# Patient Record
Sex: MALE | Race: WHITE | NOT HISPANIC OR LATINO | Employment: FULL TIME | URBAN - METROPOLITAN AREA
[De-identification: names, ages, dates, MRNs, and addresses within clinical notes are randomized per-mention and may not be internally consistent; named-entity substitution may affect disease eponyms.]

---

## 2018-02-25 ENCOUNTER — TELEPHONE (OUTPATIENT)
Dept: SPORTS MEDICINE | Facility: CLINIC | Age: 58
End: 2018-02-25

## 2018-02-25 DIAGNOSIS — M23.92 INTERNAL DERANGEMENT OF LEFT KNEE: Primary | ICD-10-CM

## 2018-02-26 ENCOUNTER — HOSPITAL ENCOUNTER (OUTPATIENT)
Dept: RADIOLOGY | Facility: HOSPITAL | Age: 58
Discharge: HOME | End: 2018-02-26
Attending: ORTHOPAEDIC SURGERY
Payer: COMMERCIAL

## 2018-02-26 DIAGNOSIS — M23.92 INTERNAL DERANGEMENT OF LEFT KNEE: ICD-10-CM

## 2018-02-26 PROCEDURE — 73721 MRI JNT OF LWR EXTRE W/O DYE: CPT | Mod: TC,PO,LT

## 2018-02-26 PROCEDURE — 73721 MRI JNT OF LWR EXTRE W/O DYE: CPT | Mod: 26,LT,, | Performed by: RADIOLOGY

## 2018-02-28 ENCOUNTER — ANESTHESIA EVENT (OUTPATIENT)
Dept: SURGERY | Facility: OTHER | Age: 58
End: 2018-02-28
Payer: COMMERCIAL

## 2018-02-28 ENCOUNTER — CLINICAL SUPPORT (OUTPATIENT)
Dept: REHABILITATION | Facility: HOSPITAL | Age: 58
End: 2018-02-28
Attending: ORTHOPAEDIC SURGERY
Payer: COMMERCIAL

## 2018-02-28 ENCOUNTER — TELEPHONE (OUTPATIENT)
Dept: SPORTS MEDICINE | Facility: CLINIC | Age: 58
End: 2018-02-28

## 2018-02-28 DIAGNOSIS — M25.562 CHRONIC PAIN OF LEFT KNEE: Primary | ICD-10-CM

## 2018-02-28 DIAGNOSIS — M23.42 LOOSE BODY OF LEFT KNEE: ICD-10-CM

## 2018-02-28 DIAGNOSIS — S83.242A TEAR OF MEDIAL MENISCUS OF LEFT KNEE, CURRENT, UNSPECIFIED TEAR TYPE, INITIAL ENCOUNTER: ICD-10-CM

## 2018-02-28 DIAGNOSIS — M25.562 LEFT ANTERIOR KNEE PAIN: Primary | ICD-10-CM

## 2018-02-28 DIAGNOSIS — G89.29 CHRONIC PAIN OF LEFT KNEE: Primary | ICD-10-CM

## 2018-02-28 DIAGNOSIS — M23.92 INTERNAL DERANGEMENT OF LEFT KNEE: Primary | ICD-10-CM

## 2018-02-28 PROCEDURE — 97161 PT EVAL LOW COMPLEX 20 MIN: CPT | Mod: PN | Performed by: PHYSICAL THERAPIST

## 2018-02-28 PROCEDURE — 97110 THERAPEUTIC EXERCISES: CPT | Mod: PN | Performed by: PHYSICAL THERAPIST

## 2018-02-28 NOTE — PROGRESS NOTES
[facility]   PATIENT EVALUATION    Date: 02/28/2018  Start Time: 11:30  Stop Time: 12:30    Patient Name: Virgilio Pitts Jr [facility] Number: [id number]  Age: 57 y.o.  Gender: male    Diagnosis:   Encounter Diagnosis   Name Primary?    Left anterior knee pain Yes       Referring Physician: Alfredito Babcock MD  Treatment Orders: PT Eval and Treat      History     No past medical history on file.    Current Outpatient Prescriptions   Medication Sig    aspirin (ECOTRIN) 81 MG EC tablet Take 81 mg by mouth once daily.       No current facility-administered medications for this visit.        Review of patient's allergies indicates:  No Known Allergies      Subjective     History of Present Condition: Pt reports going skiing over [place] and the left knee stiffened up. It buckled on me and just locked up that AM. It has been painful stiff since. It feels like it moves and I feel like I cant fully straighten it. Reports being able to sleep well but activities are limited.    PMHx:  Not remarkable    Onset Date: 5 years on and off  Date of Surgery: pending  Precautions: none    Mechanism of Injury: gradual    Pain Location: Left knee  Pain Description: Aching and Dull  Current Pain: 0/10  Least Pain: 0/10  Worst Pain: 9/10  Aggravating Factors:   Relieving Factors: rest, ice    Diagnostic Tests: MRI    Occupation: accounting/finances      Sports/Recreational Activities: golf, tennis, skiing  Extremity Dominance: R    Prior Level of Function: Independent  Functional Deficits Leading to Referral/Nature of Injury: none  Patient Therapy Goals: To get back to golf and tennis without pain or dysfunction  Cultural/Environmental/Spiritual Barriers to Treatment or Learning: none      Objective     Observation: decreased quad tone on RLE.  Posture: grossly normal  Gait: increased stance on LLE.      Palpation: TTP over medial joint line    Range of Motion:   Left knee: 0-5-116    Joint  Mobility: hypomobile patella grossly tested  Flexibility: increased tension with left hamstring/gastroc    Strength:   Diminished quad firing on LLE  Bilateral hip abd = 3+/5    Special Tests: + medial joint line tenderness      Treatment:   Seated extension x 5 min  Quad set towel x 30  Quad set flat x 30  CP extension x 10 min      Functional Limitations Reports - G Codes  Category: Mobility  Tool: FOTO  Score: 57    History  Co-morbidities and personal factors that may impact the plan of care Low    Stable Clinical Presentation   Co-morbidities:       Personal Factors:     Body regions    Body systems    Activity Limitations    Participation Restrictons   No personal factors and/ or  comorbidites          Address 1-2 elements:           Assessment     This is a 57 y.o. male referred to outpatient physical therapy and presents with a medical diagnosis of left knee pain and demonstrates limitations as described in the problem list. Pt demonstrates good rehab potential. Pt will benefit from physcial therapy services in order to maximize pain free and/or functional use of left LE. The following goals were discussed with the patient and patient is in agreement with them as to be addressed in the treatment plan. Pt was given a HEP consisting of functional quad and extension therex. Pt verbally understood the instructions as they were given and demonstrated proper form and technique during therapy. Pt was advised to perform these exercises free of pain, and to stop performing them if pain occurs.     Medical necessity is demonstrated by the following problem list:   - Pain limits function of effected part for all activities  - Unable to participate in daily activities   - Requires skilled supervision to complete and progress HEP  - Fall risk - impaired balance   - Continued inability to participate in vocational pursuits    Short Term Goals (6 Weeks):  - Pt will increase ROM of left knee = right knee  - Pt will increase  strength of bilateral hip abd = 5/5  - Decrease Pain to 0/10 with ADLs  - Pt to self correct posture independently.  - Pt independent with HEP with progressions.     Long Term Goals (16-18 Weeks):  * to be determined after surgery      Plan     Pt will be treated by physical therapy 1-2  times a week for 16-18  weeks for manual therapy, therapeutic exercise, home exercise program, patient education, and modalities PRN to achieve established goals. Virgilio Pitts Jr may at times be seen by a PTA as part of the Rehab Team.     Therapist: EDDIE MIMS, PT    I CERTIFY THE NEED FOR THESE SERVICES FURNISHED UNDER THIS PLAN OF TREATMENT AND WHILE UNDER MY CARE    Physician's comments: ________________________________________________________________________________________________________________________________________________    Physician's Name: ___________________________________

## 2018-03-01 ENCOUNTER — ANESTHESIA (OUTPATIENT)
Dept: SURGERY | Facility: OTHER | Age: 58
End: 2018-03-01
Payer: COMMERCIAL

## 2018-03-01 ENCOUNTER — HOSPITAL ENCOUNTER (OUTPATIENT)
Facility: OTHER | Age: 58
Discharge: HOME | End: 2018-03-01
Attending: ORTHOPAEDIC SURGERY | Admitting: ORTHOPAEDIC SURGERY
Payer: COMMERCIAL

## 2018-03-01 VITALS
SYSTOLIC BLOOD PRESSURE: 136 MMHG | HEART RATE: 78 BPM | WEIGHT: 208 LBS | OXYGEN SATURATION: 99 % | BODY MASS INDEX: 27.57 KG/M2 | HEIGHT: 73 IN | TEMPERATURE: 98 F | RESPIRATION RATE: 18 BRPM | DIASTOLIC BLOOD PRESSURE: 80 MMHG

## 2018-03-01 DIAGNOSIS — M23.92 INTERNAL DERANGEMENT OF KNEE JOINT, LEFT: ICD-10-CM

## 2018-03-01 DIAGNOSIS — M23.92 INTERNAL DERANGEMENT OF KNEE JOINT, LEFT: Primary | ICD-10-CM

## 2018-03-01 PROCEDURE — 25000003 PHARM REV CODE 250: Performed by: NURSE ANESTHETIST, CERTIFIED REGISTERED

## 2018-03-01 PROCEDURE — 63600175 PHARM REV CODE 636 W HCPCS: Performed by: ANESTHESIOLOGY

## 2018-03-01 PROCEDURE — 63600175 PHARM REV CODE 636 W HCPCS: Performed by: NURSE ANESTHETIST, CERTIFIED REGISTERED

## 2018-03-01 PROCEDURE — 25000003 PHARM REV CODE 250: Performed by: ANESTHESIOLOGY

## 2018-03-01 PROCEDURE — 71000033 HC RECOVERY, INTIAL HOUR: Performed by: ORTHOPAEDIC SURGERY

## 2018-03-01 PROCEDURE — 25000003 PHARM REV CODE 250: Performed by: PHYSICIAN ASSISTANT

## 2018-03-01 PROCEDURE — 37000008 HC ANESTHESIA 1ST 15 MINUTES: Performed by: ORTHOPAEDIC SURGERY

## 2018-03-01 PROCEDURE — 71000015 HC POSTOP RECOV 1ST HR: Performed by: ORTHOPAEDIC SURGERY

## 2018-03-01 PROCEDURE — 29881 ARTHRS KNE SRG MNISECTMY M/L: CPT | Mod: LT,,, | Performed by: ORTHOPAEDIC SURGERY

## 2018-03-01 PROCEDURE — 25000003 PHARM REV CODE 250: Performed by: ORTHOPAEDIC SURGERY

## 2018-03-01 PROCEDURE — 36000710: Performed by: ORTHOPAEDIC SURGERY

## 2018-03-01 PROCEDURE — 36000711: Performed by: ORTHOPAEDIC SURGERY

## 2018-03-01 PROCEDURE — 27201423 OPTIME MED/SURG SUP & DEVICES STERILE SUPPLY: Performed by: ORTHOPAEDIC SURGERY

## 2018-03-01 PROCEDURE — V2790 AMNIOTIC MEMBRANE: HCPCS | Performed by: ORTHOPAEDIC SURGERY

## 2018-03-01 PROCEDURE — 37000009 HC ANESTHESIA EA ADD 15 MINS: Performed by: ORTHOPAEDIC SURGERY

## 2018-03-01 PROCEDURE — 63600175 PHARM REV CODE 636 W HCPCS: Performed by: ORTHOPAEDIC SURGERY

## 2018-03-01 PROCEDURE — 71000016 HC POSTOP RECOV ADDL HR: Performed by: ORTHOPAEDIC SURGERY

## 2018-03-01 DEVICE — MATRIX REGENERATIVE CLARIX FLO: Type: IMPLANTABLE DEVICE | Site: KNEE | Status: FUNCTIONAL

## 2018-03-01 RX ORDER — LIDOCAINE HCL/PF 100 MG/5ML
SYRINGE (ML) INTRAVENOUS
Status: DISCONTINUED | OUTPATIENT
Start: 2018-03-01 | End: 2018-03-01

## 2018-03-01 RX ORDER — ONDANSETRON 2 MG/ML
4 INJECTION INTRAMUSCULAR; INTRAVENOUS EVERY 12 HOURS PRN
Status: DISCONTINUED | OUTPATIENT
Start: 2018-03-01 | End: 2018-03-01 | Stop reason: HOSPADM

## 2018-03-01 RX ORDER — MUPIROCIN 20 MG/G
OINTMENT TOPICAL
Status: DISCONTINUED | OUTPATIENT
Start: 2018-03-01 | End: 2018-03-01 | Stop reason: HOSPADM

## 2018-03-01 RX ORDER — KETOROLAC TROMETHAMINE 30 MG/ML
30 INJECTION, SOLUTION INTRAMUSCULAR; INTRAVENOUS ONCE
Status: COMPLETED | OUTPATIENT
Start: 2018-03-01 | End: 2018-03-01

## 2018-03-01 RX ORDER — OXYCODONE HYDROCHLORIDE 5 MG/1
10 TABLET ORAL EVERY 4 HOURS PRN
Status: DISCONTINUED | OUTPATIENT
Start: 2018-03-01 | End: 2018-03-01 | Stop reason: HOSPADM

## 2018-03-01 RX ORDER — ACETAMINOPHEN 10 MG/ML
1000 INJECTION, SOLUTION INTRAVENOUS EVERY 8 HOURS
Status: DISCONTINUED | OUTPATIENT
Start: 2018-03-01 | End: 2018-03-01 | Stop reason: HOSPADM

## 2018-03-01 RX ORDER — FENTANYL CITRATE 50 UG/ML
25 INJECTION, SOLUTION INTRAMUSCULAR; INTRAVENOUS EVERY 5 MIN PRN
Status: DISCONTINUED | OUTPATIENT
Start: 2018-03-01 | End: 2018-03-01 | Stop reason: HOSPADM

## 2018-03-01 RX ORDER — MORPHINE SULFATE 10 MG/ML
2 INJECTION INTRAMUSCULAR; INTRAVENOUS; SUBCUTANEOUS EVERY 4 HOURS PRN
Status: DISCONTINUED | OUTPATIENT
Start: 2018-03-01 | End: 2018-03-01 | Stop reason: HOSPADM

## 2018-03-01 RX ORDER — SODIUM CHLORIDE 0.9 % (FLUSH) 0.9 %
3 SYRINGE (ML) INJECTION
Status: DISCONTINUED | OUTPATIENT
Start: 2018-03-01 | End: 2018-03-01 | Stop reason: HOSPADM

## 2018-03-01 RX ORDER — OXYCODONE HYDROCHLORIDE 5 MG/1
5 TABLET ORAL
Status: DISCONTINUED | OUTPATIENT
Start: 2018-03-01 | End: 2018-03-01 | Stop reason: HOSPADM

## 2018-03-01 RX ORDER — MEPERIDINE HYDROCHLORIDE 50 MG/ML
12.5 INJECTION INTRAMUSCULAR; INTRAVENOUS; SUBCUTANEOUS ONCE AS NEEDED
Status: DISCONTINUED | OUTPATIENT
Start: 2018-03-01 | End: 2018-03-01 | Stop reason: SDUPTHER

## 2018-03-01 RX ORDER — TRAMADOL HYDROCHLORIDE 50 MG/1
50 TABLET ORAL EVERY 6 HOURS PRN
Qty: 30 TABLET | Refills: 0 | Status: SHIPPED | OUTPATIENT
Start: 2018-03-01 | End: 2018-03-11

## 2018-03-01 RX ORDER — CEFAZOLIN SODIUM 1 G/3ML
2 INJECTION, POWDER, FOR SOLUTION INTRAMUSCULAR; INTRAVENOUS
Status: DISCONTINUED | OUTPATIENT
Start: 2018-03-01 | End: 2018-03-01 | Stop reason: HOSPADM

## 2018-03-01 RX ORDER — ONDANSETRON 2 MG/ML
INJECTION INTRAMUSCULAR; INTRAVENOUS
Status: DISCONTINUED | OUTPATIENT
Start: 2018-03-01 | End: 2018-03-01

## 2018-03-01 RX ORDER — FENTANYL CITRATE 50 UG/ML
INJECTION, SOLUTION INTRAMUSCULAR; INTRAVENOUS
Status: DISCONTINUED | OUTPATIENT
Start: 2018-03-01 | End: 2018-03-01

## 2018-03-01 RX ORDER — PROPOFOL 10 MG/ML
VIAL (ML) INTRAVENOUS
Status: DISCONTINUED | OUTPATIENT
Start: 2018-03-01 | End: 2018-03-01

## 2018-03-01 RX ORDER — SODIUM CHLORIDE 0.9 % (FLUSH) 0.9 %
5 SYRINGE (ML) INJECTION
Status: DISCONTINUED | OUTPATIENT
Start: 2018-03-01 | End: 2018-03-01 | Stop reason: HOSPADM

## 2018-03-01 RX ORDER — ONDANSETRON 2 MG/ML
4 INJECTION INTRAMUSCULAR; INTRAVENOUS DAILY PRN
Status: DISCONTINUED | OUTPATIENT
Start: 2018-03-01 | End: 2018-03-01 | Stop reason: SDUPTHER

## 2018-03-01 RX ORDER — PROMETHAZINE HYDROCHLORIDE 25 MG/1
25 TABLET ORAL EVERY 4 HOURS
Qty: 30 TABLET | Refills: 0 | Status: SHIPPED | OUTPATIENT
Start: 2018-03-01 | End: 2021-02-25

## 2018-03-01 RX ORDER — PROMETHAZINE HYDROCHLORIDE 25 MG/1
25 TABLET ORAL EVERY 6 HOURS PRN
Status: DISCONTINUED | OUTPATIENT
Start: 2018-03-01 | End: 2018-03-01 | Stop reason: HOSPADM

## 2018-03-01 RX ORDER — SODIUM CHLORIDE, SODIUM LACTATE, POTASSIUM CHLORIDE, CALCIUM CHLORIDE 600; 310; 30; 20 MG/100ML; MG/100ML; MG/100ML; MG/100ML
INJECTION, SOLUTION INTRAVENOUS CONTINUOUS PRN
Status: DISCONTINUED | OUTPATIENT
Start: 2018-03-01 | End: 2018-03-01

## 2018-03-01 RX ORDER — MIDAZOLAM HYDROCHLORIDE 1 MG/ML
INJECTION INTRAMUSCULAR; INTRAVENOUS
Status: DISCONTINUED | OUTPATIENT
Start: 2018-03-01 | End: 2018-03-01

## 2018-03-01 RX ORDER — HYDROMORPHONE HYDROCHLORIDE 2 MG/ML
0.4 INJECTION, SOLUTION INTRAMUSCULAR; INTRAVENOUS; SUBCUTANEOUS EVERY 5 MIN PRN
Status: DISCONTINUED | OUTPATIENT
Start: 2018-03-01 | End: 2018-03-01 | Stop reason: HOSPADM

## 2018-03-01 RX ORDER — MEPERIDINE HYDROCHLORIDE 50 MG/ML
12.5 INJECTION INTRAMUSCULAR; INTRAVENOUS; SUBCUTANEOUS ONCE AS NEEDED
Status: DISCONTINUED | OUTPATIENT
Start: 2018-03-01 | End: 2018-03-01 | Stop reason: HOSPADM

## 2018-03-01 RX ORDER — OXYCODONE AND ACETAMINOPHEN 5; 325 MG/1; MG/1
1 TABLET ORAL EVERY 4 HOURS PRN
Qty: 40 TABLET | Refills: 0 | Status: SHIPPED | OUTPATIENT
Start: 2018-03-01 | End: 2021-02-25

## 2018-03-01 RX ORDER — ONDANSETRON 2 MG/ML
4 INJECTION INTRAMUSCULAR; INTRAVENOUS DAILY PRN
Status: DISCONTINUED | OUTPATIENT
Start: 2018-03-01 | End: 2018-03-01 | Stop reason: HOSPADM

## 2018-03-01 RX ORDER — EPINEPHRINE 1 MG/ML
INJECTION, SOLUTION INTRACARDIAC; INTRAMUSCULAR; INTRAVENOUS; SUBCUTANEOUS
Status: DISCONTINUED | OUTPATIENT
Start: 2018-03-01 | End: 2018-03-01 | Stop reason: HOSPADM

## 2018-03-01 RX ORDER — ASPIRIN 325 MG
325 TABLET ORAL EVERY 12 HOURS
Qty: 84 TABLET | Refills: 0 | Status: SHIPPED | OUTPATIENT
Start: 2018-03-01 | End: 2018-04-12

## 2018-03-01 RX ADMIN — ONDANSETRON 4 MG: 2 INJECTION INTRAMUSCULAR; INTRAVENOUS at 12:03

## 2018-03-01 RX ADMIN — EPHEDRINE SULFATE 15 MG: 50 INJECTION INTRAMUSCULAR; INTRAVENOUS; SUBCUTANEOUS at 11:03

## 2018-03-01 RX ADMIN — PROPOFOL 200 MG: 10 INJECTION, EMULSION INTRAVENOUS at 11:03

## 2018-03-01 RX ADMIN — MUPIROCIN: 20 OINTMENT TOPICAL at 08:03

## 2018-03-01 RX ADMIN — LIDOCAINE HYDROCHLORIDE 100 MG: 20 INJECTION, SOLUTION INTRAVENOUS at 11:03

## 2018-03-01 RX ADMIN — MIDAZOLAM HYDROCHLORIDE 2 MG: 1 INJECTION, SOLUTION INTRAMUSCULAR; INTRAVENOUS at 10:03

## 2018-03-01 RX ADMIN — KETOROLAC TROMETHAMINE 30 MG: 30 INJECTION, SOLUTION INTRAMUSCULAR at 01:03

## 2018-03-01 RX ADMIN — CARBOXYMETHYLCELLULOSE SODIUM 2 DROP: 2.5 SOLUTION/ DROPS OPHTHALMIC at 11:03

## 2018-03-01 RX ADMIN — FENTANYL CITRATE 100 MCG: 50 INJECTION, SOLUTION INTRAMUSCULAR; INTRAVENOUS at 11:03

## 2018-03-01 RX ADMIN — EPHEDRINE SULFATE 15 MG: 50 INJECTION INTRAMUSCULAR; INTRAVENOUS; SUBCUTANEOUS at 12:03

## 2018-03-01 RX ADMIN — EPHEDRINE SULFATE 10 MG: 50 INJECTION INTRAMUSCULAR; INTRAVENOUS; SUBCUTANEOUS at 11:03

## 2018-03-01 RX ADMIN — PROPOFOL 100 MG: 10 INJECTION, EMULSION INTRAVENOUS at 11:03

## 2018-03-01 RX ADMIN — SODIUM CHLORIDE, SODIUM LACTATE, POTASSIUM CHLORIDE, AND CALCIUM CHLORIDE: 600; 310; 30; 20 INJECTION, SOLUTION INTRAVENOUS at 10:03

## 2018-03-01 RX ADMIN — OXYCODONE HYDROCHLORIDE 5 MG: 5 TABLET ORAL at 01:03

## 2018-03-01 RX ADMIN — ACETAMINOPHEN 1000 MG: 10 INJECTION, SOLUTION INTRAVENOUS at 01:03

## 2018-03-01 NOTE — PLAN OF CARE
Virgilio Maher Jr. has met all discharge criteria from Phase II. Vital Signs are stable, ambulating  without difficulty. Discharge instructions given, patient verbalized understanding. Discharged from facility via wheelchair in stable condition.

## 2018-03-01 NOTE — H&P
Virgilio Maher   is here for a completion of his perioperative paperwork. he  Is scheduled to undergo   1. Left knee arthroscopic meniscectomy  2. Left knee arthroscopic chondroplasty  3. Left knee arthroscopic synovectomy  4. Left knee arthroscopic loose body removal  5. Left knee Aminox arthrocentesis on 03/01/2018.      He is a healthy individual and does not need clearance for this procedure.     Risks, indications and benefits of the surgical procedure were discussed with the patient. All questions with regard to surgery, rehab, expected return to functional activities, activities of daily living and recreational endeavors were answered to his satisfaction.    Patient was informed and understands the risks of surgery are greater for patients with a current condition or history of heart disease, obesity, clotting disorders, recurrent infections, steroid use, current or past smoking, and factors such as sedentary lifestyle and noncompliance with medications, therapy or follow-up. The degree of the increased risk is hard to estimate with any degree of precision.    Once no other questions were asked, a brief history and physical exam was then performed.      PHYSICAL EXAM:  GEN: A&Ox3, WD WN NAD  HEENT: WNL  CHEST: CTAB, no W/R/R  HEART: RRR, no M/R/G   ABD: Soft, NT ND, BS x4 QUADS  MS: Refer to previous note for detailed MS exam  NEURO: CN II-XII intact       The surgical consent was then reviewed with the patient, who agreed with all the contents of the consent form and it was signed. he was then given the [facility] surgery packet to bring with him to [facility] for the anesthesia portion of his perioperative paperwork.     PHYSICAL THERAPY:  He was also instructed regarding physical therapy and will begin POD # 1-3. He was given a copy of the original prescription to schedule.     POST OP CARE:instructions were reviewed including care of the wound and dressing after surgery and when he can shower.     PAIN  MANAGEMENT: Virgilio Maher Jr. was also given a pain management regime, which includes the TENS unit given to him by Oneil Villa along with the education required for its use. He was also instructed regarding the Polar ice unit that will be in place after surgery and his postoperative pain medications.     PAIN MEDICATION:  Percocet 10/325mg 1 po q 4-6 hours prn pain  Ultram 50 mg one p.o. q.4-6 hours p.r.n. breakthrough pain,   Phenergan 25 mg one p.o. q.4-6 hours p.r.n. nausea and vomiting.    Patient denies history of seizures.     Patient was instructed to buy and take:  Aspirin 325mg BID x 6 weeks for DVT prophylaxis starting on the evening after surgery.  Patient will also use bilateral TEDs on lower extremities, SCDs during surgery, and early ambulation post-op. If the patient was previously taking 81mg baby aspirin, they were told to not take it will using the above stated aspirin and to restart the 81mg aspirin after completion of the aspirin dose.       Patient was also told to buy over the counter Prilosec medication and take it once daily for GI protection as long as they are taking NSAIDs or Aspirin.    DVT prophylaxis was discussed with the patient today including risk factors for developing DVTs and history of DVTs. The patient was asked if any specific recommendations were given from the doctor/s that did pre-operative surgical clearance.      This along with the Modified Caprini risk assessment model for VTE in general surgical patients was used to determine the patient's DVT risk.      The below listed DVT prophylaxis regimen along with bilateral FRANK compression stockings will be used post-op. Length of treatment has been determined to be 10-42 days post-op by the above noted Caprini assessment model.      Patient was asked if they were taking or using OCP pills or devices. If they answered yes, then they were instructed to stop using OCPs at this pre-operative appointment until 2 months post-op  to help prevent DVT development. They understand that there are other forms of birth control that do not involve hormones. They expressed understanding that ignoring/not following this instruction could result in a DVT which could turn into a deadly pulmonary embolism.      The patient was told that narcotic pain medications may make them drowsy and instructions were given to not sign legal documents, drive or operate heavy machinery, cars, or equipment while under the influence of narcotic medications.     As there were no other questions to be asked, he was given my business card along with Barbara Leija MD business card if he has any questions or concerns prior to surgery or in the postop period.

## 2018-03-01 NOTE — ANESTHESIA PREPROCEDURE EVALUATION
03/01/2018  Virgilio Maher Jr. is a 57 y.o., male.    Anesthesia Evaluation    I have reviewed the Patient Summary Reports.    I have reviewed the Nursing Notes.   I have reviewed the Medications.     Review of Systems  Anesthesia Hx:  No problems with previous Anesthesia  Denies Family Hx of Anesthesia complications.   Denies Personal Hx of Anesthesia complications.   Social:  Non-Smoker    Hematology/Oncology:  Hematology Normal   Oncology Normal     EENT/Dental:EENT/Dental Normal   Cardiovascular:  Cardiovascular Normal Exercise tolerance: good     Pulmonary:  Pulmonary Normal    Renal/:  Renal/ Normal     Musculoskeletal:  Musculoskeletal Normal    Neurological:  Neurology Normal    Endocrine:  Endocrine Normal    Dermatological:  Skin Normal    Psych:  Psychiatric Normal           Physical Exam  General:  Well nourished    Airway/Jaw/Neck:  Airway Findings: Mouth Opening: Normal Tongue: Normal  General Airway Assessment: Adult  Mallampati: I  TM Distance: Normal, at least 6 cm  Jaw/Neck Findings:  Neck ROM: Normal ROM      Dental:  Dental Findings: In tact             Anesthesia Plan  Type of Anesthesia, risks & benefits discussed:  Anesthesia Type:  general  Patient's Preference:   Intra-op Monitoring Plan:   Intra-op Monitoring Plan Comments:   Post Op Pain Control Plan:   Post Op Pain Control Plan Comments:   Induction:   IV  Beta Blocker:         Informed Consent: Patient understands risks and agrees with Anesthesia plan.  Questions answered. Anesthesia consent signed with patient.  ASA Score: 1     Day of Surgery Review of History & Physical:    H&P update referred to the surgeon.         Ready For Surgery From Anesthesia Perspective.

## 2018-03-01 NOTE — PROGRESS NOTES
Physical Therapy Daily Note     Date: 03/02/2018  Name: Virgilio Maher Jr.  Clinic Number: [id number]  Diagnosis: PROCEDURES(S) PERFORMED:   1. Left  Arthroscopy, debridement/shaving of articular cartilage (chondroplasty) 59203  2.  Left  Arthroscopy, with meniscectomy (medial OR lateral) 23485, medial  3.  Left  Arthroscopy, knee, for removal of loose body or foreign body 08648  4.  Left  Amniox Arthrocentesis, 84067  Encounter Diagnosis   Name Primary?    Left anterior knee pain Yes     Physician: Alfredito Babcock MD    Evaluation Date: 2/28/18  Date of Surgery: 3/1/18  Visit #: 2   Start Time:  11:00  Stop Time:  12:00  Total Treatment Time: 60    Precautions: PHYSICAL THERAPY:  The patient should begin physical therapy on postoperative   day # 3 and will be advanced to outpatient therapy as soon as   Possible following discharge.  Weight bearing:as tolerated  left leg  Range of Motion:Full normal motion symmetric to opposite side    Subjective     Pt reports the knee is actually feeling well for having surgery.    Pain: 2/10    Objective     Measurements taken:   Left knee ROM: 0-3-65  Diminished left knee quad control    Patient received individual therapy to increase strength, endurance and ROM with activities as follows:     Virgilio received therapeutic exercises to develop strength, endurance and ROM for 40 minutes including:   Seated extension x 5 min 3#  Quad set towel x 30  Quad set flat x 30  EOT flexion x 3 min  Seated extension x 5 min 3#        Virgilio received the following manual therapy techniques: Joint mobilizations and Soft tissue Mobilization were applied to the: left knee for 5 minutes.   PROM: ext  Patellar mobs        Written Home Exercises Provided: updated as per therex list  Pt demo good understanding of the education provided. Virgilio demonstrated good return demonstration of activities.     Education provided:  Virgilio verbalized good  understanding of education provided.   No spiritual or educational barriers to learning identified.    Assessment     Reviewed post-op precautions and therex performed as HEP. Reviewed bandage instructions and safety regarding water/wound care. Pt verbal/demo understanding. Improved quad control and terminal knee extension with therex. Continue to progress as tolerated with updated goals below.    This is a 57 y.o. male referred to outpatient physical therapy and presents with a medical diagnosis of left knee menisectomy and demonstrates limitations as described in the problem list Pt prognosis is Good. Pt will continue to benefit from skilled outpatient physical therapy to address the deficits listed below in the problem list, provide pt/family education and to maximize pt's level of independence in the home and community environment.    Will the patient continue to benefit from skilled PT intervention? yes        Medical necessity is demonstrated by:   - Pain limits function of effected part for all activities  - Unable to participate in daily activities   - Requires skilled supervision to complete and progress HEP  - Fall risk - impaired balance   - Continued inability to participate in vocational pursuits    Short Term Goals (6 Weeks):  - Pt will increase ROM of left knee = right knee  - Pt will increase strength of bilateral hip abd = 5/5  - Decrease Pain to 0/10 with ADLs  - Pt to self correct posture independently.  - Pt independent with HEP with progressions.      Long Term Goals (16-18 Weeks):  - Pt able to SLS without valgus 3x10  - Pt with normal loading mechanics with lunge walks.  - Pt with Ybalance = 94% of uninvolved.  - Pt with normalized jogging mechanics with 0/10 pain.       Plan   Continue with established Plan of Care towards PT goals.      Therapist: EDDIE MIMS, PT

## 2018-03-01 NOTE — ANESTHESIA POSTPROCEDURE EVALUATION
"Anesthesia Post Evaluation    Patient: Virgilio Maher JrSumanth    Procedure(s) Performed: Procedure(s) (LRB):  ARTHROSCOPY-KNEE (Left)  CHONDROPLASTY-KNEE (Left)  PARTIAL SYNOVECTOMY-KNEE (Left)  INJECTION-STEROID; LEFT KNEE AMNIOX  (Left)  ARTHROSCOPY-MENISCECTOMY MEDIAL AND LATERAL (Left)  REMOVAL-LOOSE-BODY-ARTHROSCOPIC (Left)  DEBRIDEMENT-KNEE (Left)  REMOVAL SPUR (Left)    Final Anesthesia Type: general  Patient location during evaluation: PACU  Patient participation: Yes- Able to Participate  Level of consciousness: awake and alert  Post-procedure vital signs: reviewed and stable  Pain management: adequate  Airway patency: patent  PONV status at discharge: No PONV  Anesthetic complications: no      Cardiovascular status: blood pressure returned to baseline  Respiratory status: unassisted and spontaneous ventilation  Hydration status: euvolemic  Follow-up not needed.        Visit Vitals  /73   Pulse 83   Temp 36.4 °C (97.6 °F) (Oral)   Resp 18   Ht 6' 1" (1.854 m)   Wt 94.3 kg (208 lb)   SpO2 97%   BMI 27.44 kg/m²       Pain/Fracisco Score: Pain Assessment Performed: Yes (3/1/2018  1:11 PM)  Presence of Pain: complains of pain/discomfort (3/1/2018  1:28 PM)  Pain Rating Prior to Med Admin: 4 (3/1/2018  1:52 PM)  Pain Rating Post Med Admin: 4 (3/1/2018  2:00 PM)  Fracisco Score: 10 (3/1/2018  2:00 PM)      "

## 2018-03-01 NOTE — OR NURSING
Reviewed   knee arthroscopy discharge instructions and demonstrated the postoperative equipment (polar ice), with verbalized understanding per patient and family.  Reviewed crutch teaching with verbalized understanding and  return demonstration per patient.  Fitted for crutches and hand  adjusted.  Height of crutches set on 6 ft. and the height of the hand  placed on the 2nd  hole from the top of the crutch..                        .

## 2018-03-01 NOTE — BRIEF OP NOTE
[facility]    Brief Operative Note     SUMMARY     Surgery Date: 3/1/2018     Surgeon(s) and Role:     * Barbara Leija MD - Primary    Assisting Surgeon: None    Pre-op Diagnosis:  Loose body of left knee [M23.42]  Internal derangement of left knee [M23.92]  Tear of medial meniscus of left knee, current, unspecified tear type, initial encounter [S83.242A]    Post-op Diagnosis:  Post-Op Diagnosis Codes:     * Loose body of left knee [M23.42]     * Internal derangement of left knee [M23.92]     * Tear of medial meniscus of left knee, current, unspecified tear type, initial encounter [S83.242A]    Procedure: Procedure(s) (LRB):  ARTHROSCOPY-KNEE (Left)  CHONDROPLASTY-KNEE (Left)  PARTIAL SYNOVECTOMY-KNEE (Left)  INJECTION-STEROID; LEFT KNEE AMNIOX  (Left)  ARTHROSCOPY-MENISCECTOMY MEDIAL AND LATERAL (Left)  REMOVAL-LOOSE-BODY-ARTHROSCOPIC (Left)  DEBRIDEMENT-KNEE (Left)  REMOVAL SPUR (Left)    Anesthesia: General    Description of the findings of the procedure: See Dictation    Findings/Key Components: see op note    Estimated Blood Loss: * No values recorded between 3/1/2018 12:11 PM and 3/1/2018  1:11 PM *         Specimens:   Specimen (12h ago through future)    None          Disposition: Patient tolerated the procedure well and was transferred to PACU in stable condition.      Discharge Note    SUMMARY     Admit Date: 3/1/2018    Discharge Date and Time:   03/01/2018 1:35 PM    Pre-op Diagnosis:  Loose body of left knee [M23.42]  Internal derangement of left knee [M23.92]  Tear of medial meniscus of left knee, current, unspecified tear type, initial encounter [S83.242A]    Post-op Diagnosis:  Post-Op Diagnosis Codes:     * Loose body of left knee [M23.42]     * Internal derangement of left knee [M23.92]     * Tear of medial meniscus of left knee, current, unspecified tear type, initial encounter [S83.242A]    Procedure: Procedure(s) (LRB):  ARTHROSCOPY-KNEE (Left)  CHONDROPLASTY-KNEE (Left)  PARTIAL  SYNOVECTOMY-KNEE (Left)  INJECTION-STEROID; LEFT KNEE AMNIOX  (Left)  ARTHROSCOPY-MENISCECTOMY MEDIAL AND LATERAL (Left)  REMOVAL-LOOSE-BODY-ARTHROSCOPIC (Left)  DEBRIDEMENT-KNEE (Left)  REMOVAL SPUR (Left)    Hospital Course (synopsis of major diagnoses, care, treatment, and services provided during the course of the hospital stay): Patient underwent outpatient knee surgery and was transferred to PACU in stable condition.  In PACU, patient received appropriate post-operative care and discharged home with plans for physical therapy and follow-up with the operative surgeon.    Diet: Regular       Final Diagnosis: Post-Op Diagnosis Codes:     * Loose body of left knee [M23.42]     * Internal derangement of left knee [M23.92]     * Tear of medial meniscus of left knee, current, unspecified tear type, initial encounter [S83.242A]    Disposition: Home or Self Care    Follow Up/Patient Instructions:     Medications:  Reconciled Home Medications:   Current Discharge Medication List      CONTINUE these medications which have NOT CHANGED    Details   aspirin 325 MG tablet Take 1 tablet (325 mg total) by mouth every 12 (twelve) hours.  Qty: 84 tablet, Refills: 0    Associated Diagnoses: Internal derangement of knee joint, left      oxyCODONE-acetaminophen (PERCOCET) 5-325 mg per tablet Take 1 tablet by mouth every 4 (four) hours as needed for Pain.  Qty: 40 tablet, Refills: 0    Associated Diagnoses: Internal derangement of knee joint, left      promethazine (PHENERGAN) 25 MG tablet Take 1 tablet (25 mg total) by mouth every 4 (four) hours.  Qty: 30 tablet, Refills: 0    Associated Diagnoses: Internal derangement of knee joint, left      traMADol (ULTRAM) 50 mg tablet Take 1 tablet (50 mg total) by mouth every 6 (six) hours as needed for Pain.  Qty: 30 tablet, Refills: 0    Associated Diagnoses: Internal derangement of knee joint, left         STOP taking these medications       aspirin (ECOTRIN) 81 MG EC tablet Comments:  "  Reason for Stopping:               Discharge Procedure Orders  CRUTCHES FOR HOME USE   Order Comments: Provide if needed   Order Specific Question Answer Comments   Type: Axillary    Height: 6' 1" (1.854 m)    Weight: 94.3 kg (208 lb)    Does patient have medical equipment at home? none    Length of need (1-99 months): 24      Diet general     Call MD for:  temperature >100.4     Call MD for:  persistent nausea and vomiting     Call MD for:  severe uncontrolled pain     Call MD for:  difficulty breathing, headache or visual disturbances     Call MD for:  redness, tenderness, or signs of infection (pain, swelling, redness, odor or green/yellow discharge around incision site)     Call MD for:  hives     Call MD for:  persistent dizziness or light-headedness     Weight bearing as tolerated     Remove dressing in 72 hours       Follow-up Information     Barbara Leija MD.    Specialties:  Sports Medicine, Orthopedic Surgery  Why:  as scheduled pre op  Contact information:  [address]  [place] [id number]  [phone]                   "

## 2018-03-01 NOTE — DISCHARGE INSTRUCTIONS
[address], [place]                                                                                          [phone]                   Postoperative Instructions for Knee Surgery                 Your Surgery Included:   Open               Arthroscopic   [] Ligament Repair       [x] Loose body removal           [] ACL     [] PCL     [] MCL     [] PLLC      [x] Synovectomy / Plica Removal [] Meniscal Cartilage Repair / Transplantation      [] Lysis of Adhesions / Manipulation [] Articular Cartilage Repair      [] Interval Release           [] Microfracture       [] OATS   [] ACI      [x] Meniscectomy           [] Osteochondral Allograft      [] Meniscal Cartilage Repair  [] Patellar Realignment       [x] Debridement / Chondroplasty         [] Lateral Release   [] Ligament Repair      [] Articular Cartilage Repair          [] Extensor Mechanism             []   Microfracture  []  OATS         []  Cartilage Biopsy [] Tendon Repair          [] Ligament Reconstruction          [] Patella                  [] Quadriceps             []   ACL    []   PCL  [] High Tibial Osteotomy       [] PRP Arthrocentesis  [] Joint Replacement         [x] Amniox Arthrocentesis           [] Unicompartmental   [] Patellofemoral                    [] Total Knee                  Call our office ([phone]) immediately if you experience any of the following:       Excessive bleeding or pus like drainage at the incision site       Uncontrollable pain not relieved by pain medication       Excessive swelling or redness at the incision site       Fever above 101.5 degrees not controlled with Tylenol or Motrin       Shortness of Breath       Any foul odor or blistering from the surgery site    FOR EMERGENCIES: If any unusual problems or difficulties occur, call our office at [phone], or page the  at [phone] who will direct your call appropriately    1.   Pain Management: A cold  therapy cuff, pain medications, local injections, and in some cases, regional anesthesia injections are used to manage your post-operative pain. The decision to use each of these options is based on their risks and benefits.     Medications: You were given one or more of the following medication prescriptions before leaving the hospital. Have the prescriptions filled at a pharmacy on your way home and follow the instructions on the bottles. If you need a refill, please call your pharmacy.      Narcotic Medication (usually Vicodin ES, Lortab, Percocet or Nucynta): Begin taking the medication before your knee starts to hurt. Some patients do not like to take any medication, but if you wait until your pain is severe before taking, you will be very uncomfortable for several hours waiting for the narcotic to work. Always take with food.     Nausea / Vomiting: For this issue, we prescribe Phenergan, use this medication as directed.     Cold Therapy: You may have been sent home with a [facility] cold therapy unit and wrap for your knee. Fill with ice and water to the indicated fill line and use throughout the day for the first two days and then as needed to help relieve pain and control swelling.      Regional Anesthesia Injections (Blocks): You may have been given a regional nerve block either before or after surgery. This may make your entire leg numb for 24-36 hours.                            * Proceed with caution when bearing weight on your leg.     2.   Diet: Eat a bland diet for the first day after surgery. Progress your diet as tolerated. Constipation may occur with Narcotic usage, contact our office if you are experiencing constipation.    3.   Activity: Limit your activity during the first 48 hours, keep your leg elevated with pillows under your heel. After the first 48 hours at home, increase your activity level based on your symptoms.    4.   Dressing Change: Remove the dressing on the 3rd day. It is normal  "for some blood to be seen on the dressings. It is also normal for you to see apparent bruising on the skin around your knee when you remove the dressing. If present, leave the steri-strip tape across the incisions. If you are concerned by the drainage or the appearance of your knee, please call one of the numbers listed below.    5.   Showering: You may shower on the 3rd day after surgery with waterproof bandages over small incisions. If you have an incision with Prineo (clear mesh), it does not need to be covered. Do not submerge in any water until after your postoperative appointment in clinic.    6.   Your procedure did not require a post-operative brace.    7.   Your procedure did not require a Continuous Passive Motion (CPM) device.    8.   Weight Bearing: You may have been sent home with crutches. If instructed (see below), use these crutches at all times unless at complete rest.      [] Non-weight bearing for      weeks (you may touch your toes to the floor)      [] Partial weight bearing for   weeks    [] 25% Body Weight   [] 50% Body Weight      [x] Full weight bearing            [x]  NOW    []  after  weeks     9.  Knee Exercises: Begin these exercises the first day after surgery in order to help you regain your motion and strength. You may do the following marked exercises:     [x] Quad Sets - Begin activating your quadriceps muscle by driving your          knee downward into full knee extension while seated on a table or bed   with a towel rolled and propped under your heel     [x] Straight Leg Raise (SLR) - While laurel your quadriceps muscle, lift     your fully extended leg to the level of your non-operative knee (as shown)     [x] Heel Slides - With the knee straight, slide your heel slowly toward your   buttocks, hold at the endpoint for 10-15 seconds, then slowly straighten     [x] Ankle pumps - With your knee straight, move your ankle in a "pumping"    fashion to activate your calf and leg " muscles      10.  Physical Therapy: Physical therapy is an essential component to your recovery from surgery. Your physical therapy will start in 3 days.    FIRST POSTOPERATIVE VISIT: As scheduled.       Select on Hyperlink below to print updated instructions from [facility]      Anesthesia: After Your Surgery  Youve just had surgery. During surgery, you received medication called anesthesia to keep you comfortable and pain-free. After surgery, you may experience some pain or nausea. This is common. Here are some tips for feeling better and recovering after surgery.    Going home  Your doctor or nurse will show you how to take care of yourself when you go home. He or she will also answer your questions. Have an adult family member or friend drive you home. For the first 24 hours after your surgery:  · Do not drive or use heavy equipment.  · Do not make important decisions or sign legal documents.  · Avoid alcohol.  · Have someone stay with you, if needed. He or she can watch for problems and help keep you safe.  Be sure to keep all follow-up appointments with your doctor. And rest after your procedure for as long as your doctor tells you to.    Coping with pain  If you have pain after surgery, pain medication will help you feel better. Take it as directed, before pain becomes severe. Also, ask your doctor or pharmacist about other ways to control pain, such as with heat, ice, and relaxation. And follow any other instructions your surgeon or nurse gives you.    Tips for taking pain medication  To get the best relief possible, remember these points:  · Pain medications can upset your stomach. Taking them with a little food may help.  · Most pain relievers taken by mouth need at least 20 to 30 minutes to take effect.  · Taking medication on a schedule can help you remember to take it. Try to time your medication so that you can take it before beginning an activity, such as dressing, walking, or  sitting down for dinner.  · Constipation is a common side effect of pain medications. Contact your doctor before taking any medications like laxatives or stool softeners to help relieve constipation. Also ask about any dietary restrictions, because drinking lots of fluids and eating foods like fruits and vegetables that are high in fiber can also help. Remember, dont take laxatives unless your surgeon has prescribed them.  · Mixing alcohol and pain medication can cause dizziness and slow your breathing. It can even be fatal. Dont drink alcohol while taking pain medication.  · Pain medication can slow your reflexes. Dont drive or operate machinery while taking pain medication.  If your health care provider tells you to take acetaminophen to help relieve your pain, ask him or her how much you are supposed to take each day. (Acetaminophen is the generic name for Tylenol and other brand-name pain relievers.) Acetaminophen or other pain relievers may interact with your prescription medicines or other over-the-counter (OTC) drugs. Some prescription medications contain acetaminophen along with other active ingredients. Using both prescription and OTC acetaminophen for pain can cause you to overdose. The FDA recommends that you read the labels on your OTC medications carefully. This will help you to clearly understand the list of active ingredients, dosing instructions, and any warnings. It may also help you avoid taking too much acetaminophen. If you have questions or don't understand the information, ask your pharmacist or health care provider to explain it to you before you take the OTC medication.    Managing nausea  Some people have an upset stomach after surgery. This is often due to anesthesia, pain, pain medications, or the stress of surgery. The following tips will help you manage nausea and get good nutrition as you recover. If you were on a special diet before surgery, ask your doctor if you should follow it  during recovery. These tips may help:  · Dont push yourself to eat. Your body will tell you when to eat and how much.  · Start off with clear liquids and soup. They are easier to digest.  · Progress to semi-solid foods (mashed potatoes, applesauce, and gelatin) as you feel ready.  · Slowly move to solid foods. Dont eat fatty, rich, or spicy foods at first.  · Dont force yourself to have three large meals a day. Instead, eat smaller amounts more often.  · Take pain medications with a small amount of solid food, such as crackers or toast to avoid nausea.      Call your surgeon if  · You still have pain an hour after taking medication (it may not be strong enough).  · You feel too sleepy, dizzy, or groggy (medication may be too strong).  · You have side effects like nausea, vomiting, or skin changes (rash, itching, or hives).   © 4453-5856 [employer] [address] [place] [address], [place] [address]. All rights reserved. This information is not intended as a substitute for professional medical care. Always follow your healthcare professional's instructions.

## 2018-03-02 ENCOUNTER — CLINICAL SUPPORT (OUTPATIENT)
Dept: REHABILITATION | Facility: HOSPITAL | Age: 58
End: 2018-03-02
Attending: ORTHOPAEDIC SURGERY
Payer: COMMERCIAL

## 2018-03-02 DIAGNOSIS — M25.562 LEFT ANTERIOR KNEE PAIN: Primary | ICD-10-CM

## 2018-03-02 PROCEDURE — 97110 THERAPEUTIC EXERCISES: CPT | Mod: PN | Performed by: PHYSICAL THERAPIST

## 2018-03-02 NOTE — OP NOTE
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 Left  Arthroscopy, debridement/shaving of articular cartilage (chondroplasty) 11999  2.  Left  Arthroscopy, with meniscectomy (medial OR lateral) 59507, medial  3.  Left  Arthroscopy, knee, for removal of loose body or foreign body 29062  4.  Left  Amniox Arthrocentesis, 64352    ANESTHESIA: General, Local and 0.5% ropivicaine mixture 30 cc    FLUIDS IN THE CASE: 1000 ml    ESTIMATED BLOOD LOSS: Minimal    URINE OUTPUT: 0 ml    COMPLICATIONS: none    CONDITION ON RETURN TO RECOVERY ROOM: Good     Expand All Collapse All       IMPLANTS UTILIZED: Clarix  mg injectable material x 1    INDICATIONS FOR OPERATIVE PROCEDURE: Virgilio Maher Jr. is a 57 y.o.  male with history of left knee pain and pathology. The patient's history and physical examination findings consistent with the procedure performed. He noted significant problems in the area of concern with problems on activities of daily living and aggressive use of the left leg. As a result of these problems and problems with overall activity level, the patient was deemed to be an appropriate candidate for operative intervention. Nonoperative versus operative options were discussed. The risks and benefits were discussed with the patient. The patient acknowledged understanding and wished to proceed with operative intervention. Informed consent was obtained prior to the  procedure. Details of the surgical procedure were explained, including incisions and probable rehabilitation course. The patient understands the likely length of convalescence after surgery; and we have explained the risks, benefits, and alternatives of surgery. Reasonable expectations and potential complications were discussed and acknowledged, including but not limited to infection, bleeding, blood clots, (DVT and/or PE), nerve injury, retear, instability, continued pain and stiffness. It was also explained that there was a chance of failure which may require further management. The patient agreed and understood and wished to proceed.       FINDINGS:     ARTICULAR CARTILAGE LESION(S):  Medial Femoral Condyle: ICRS Grade 3      Size: 2.5 x 2.5 cm  Medial tibial plateau: ICRS Grade 2      Size: 2.0 x 2.5 cm        Lateral Femoral Condyle: ICRS Grade 2      Size: 1.0 x 1.0 cm  Lateral tibial plateau: ICRS Grade 0      Size: none        Patellar surface: ICRS Grade 4A      Size: 3.0 x 3.5 cm  Trochlear groove: ICRS Grade 4A      Size: 3.5 x 3.5 cm        EXAMINATION UNDER ANESTHESIA:   Extension 5 degrees  Flexion 130 degrees  Lachman Maneuver:  Negative  Anterior Drawer: Negative  Pivot Shift: Negative  Posterior Drawer:  Negative  Varus stability @ 30 degrees: 0  Valgus stability @ 30 degrees: 0  Patellar glide:1 quadrant lateral, 1 quadrant medial          DESCRIPTION OF PROCEDURE: The patient was brought into the Operating Room and placed in supine position. Upon application of no block in the preoperative holding area, the patient underwent General to stabilize the airway. The patient was given the appropriate dose of antibiotics based on body weight. Timeout was utilized to verify the side as the operative side. Both upper extremities were placed in comfortable position. Examination under anesthesia was performed. The nonoperative leg was carefully padded along the heel and peroneal nerve regions and then placed  into a well padded well leg chappell. The operative leg was then placed into an arthroscopic leg chappell with a tourniquet applied proximally.  No bump was placed under the hip on the operative side. The operative leg was prepped and draped in a sterile fashion with ChloraPrep material.    We injected 0.5% ropivacaine mixture into the anterolateral and anteromedial aspect of the knee with application of 15 mL per portal site. A #11 blade was used to make the arthroscopic portals. Blunt trocar and sheath were inserted into the intercondylar notch and then subsequently into the suprapatellar pouch. This patellofemoral joint was visualized, demonstrating moderate lateral patellar tilt, moderate patellar subluxation. The patellar tracked laterally with flexion and extension. Arthroscopic pictures were obtained. There was articular cartilage damage was present in the patellofemoral compartment as noted in the Findings section of this operative report. The lesion if present was treated with arthroscopic chondroplasty technique removing all irregular edges and flaps of articular cartilage at the lesion.    Attention was turned to the intercondylar notch where the anterior cruciate ligament (ACL) and posterior cruciate ligament (PCL) structures were visualized. Visualization demonstrated an intact ACL and an intact PCL. Probe analysis revealed no signs of occult pathology within the ligamentous structures.     Attention was then turned to the lateral compartment. The patient demonstrated an intact lateral meniscus with probe analysis demonstrating no occult tears or pathology Arthroscopic instrumentation was used to veify no tear and pictures obtained. articular cartilage damage was present in the lateral compartment as noted in the Findings section of this operative report. The lesion if present was treated witharthroscopic chondroplasty technique removing all irregular edges and flaps of articular cartilage at the  lesion.    Attention was then turned to the medial compartment. The patient demonstrated a complex posterior horn, body and central body  type tear of the posterior and central region of the medial meniscus. Arthroscopic instrumentation was used to  carefully remove the tear and removing 50 % of the central and posterior body and horn region of the medial meniscus. The remaining meniscus structure medially was found to be intact. There was articular cartilage damage was present in the medial compartment as noted in the Findings section of this operative report. The lesion if present was treated with arthroscopic chondroplasty technique removing all irregular edges and flaps of articular cartilage at the lesion.    Arthroscopic major synovectomy was performed in the suprapatellar pouch, medial and lateral gutters and the anterior regions of the knee.    Arthroscopic loose bodies were removed from the suprapatellar pouch regions of the knee.     Final arthroscopic pictures were obtained. Fluid was extravasated from the joint. A 4-0 nylon sutures were used to close the arthroscopic portals. We then injected additional 0.5% ropivicaine mixture using 15 ml per portal site and along then anterior portion of the knee. Amniox arthrocentesis was performed. Xeroform was applied along with application of sterile electrodes proximally and distally, gauze, ABD pads,cast padding, long-leg FRANK hose stocking and cooling unit. No immobilizer was required postoperatively for this patient. The patient was then allowed to recover from anesthesia.  General was removed. The patient was taken to Recovery Room in  Good  condition. At the completion case, all instrument and sponge counts were   correct.    NOTE: I was present and scrubbed for the key portions of the procedure.    PHYSICAL THERAPY:  The patient should begin physical therapy on postoperative   day # 3 and will be advanced to outpatient therapy as soon as   Possible following  discharge.  Weight bearing:as tolerated  left leg  Range of Motion:Full normal motion symmetric to opposite side    No CPM required due to procedure performed      Additional exercises to be performed are:   to begin quad sets with a heel roll to obtain hyperextension, straight leg raise and heel slides with the heel supported in a closed-chain fashion    Immediate specific exercises should include:   Gait program should include the above stated weightbearing; in addition: active extension with a heel-to-toe gait working on maintaining extension    Discharge summary:  The patient was discharged to home in Good  Follow-up as scheduled preoperatively.    Medication(s): Refer to Discharge Medication List         Resume preoperative diet as tolerated    Activity per outpatient discharge instruction sheet

## 2018-03-02 NOTE — OP NOTE
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 Left  Arthroscopy, debridement/shaving of articular cartilage (chondroplasty) 65331  2.  Left  Arthroscopy, knee, for removal of loose body or foreign body 36933  3.  Left  Arthroscopy, knee, synovectomy, limited 02119  4.  Left  Arthroscopy, with meniscectomy (medial OR lateral) 52334, medial  5.  Left  Amniox Arthrocentesis, 95603        ANESTHESIA: General and Local    FLUIDS IN THE CASE: 1000 ml    ESTIMATED BLOOD LOSS: Minimal    URINE OUTPUT: 0 ml    COMPLICATIONS: none    CONDITION ON RETURN TO RECOVERY ROOM: Good     Expand All Collapse All       IMPLANTS UTILIZED: Clarix JOE 100mg injectable material      INDICATIONS FOR OPERATIVE PROCEDURE: Virgilio Maher Jr. is a 57 y.o.  male with history of left knee pain and pathology. The patient's history and physical examination findings consistent with the procedure performed. He noted significant problems in the area of concern with problems on activities of daily living and aggressive use of the left leg. As a result of these problems and problems with overall activity level, the patient was deemed to be an appropriate candidate for operative intervention. Nonoperative versus operative options were discussed. The risks and benefits were discussed with the patient. The patient acknowledged understanding and wished to proceed with  operative intervention. Informed consent was obtained prior to the procedure. Details of the surgical procedure were explained, including incisions and probable rehabilitation course. The patient understands the likely length of convalescence after surgery; and we have explained the risks, benefits, and alternatives of surgery. Reasonable expectations and potential complications were discussed and acknowledged, including but not limited to infection, bleeding, blood clots, (DVT and/or PE), nerve injury, retear, instability, continued pain and stiffness. It was also explained that there was a chance of failure which may require further management. The patient agreed and understood and wished to proceed.       FINDINGS:     ARTICULAR CARTILAGE LESION(S):  Medial Femoral Condyle: ICRS Grade 3      Size: 3.0 x 3.5 cm  Medial tibial plateau: ICRS Grade 2      Size: 1.5 x 2.0 cm        Lateral Femoral Condyle: ICRS Grade 2      Size: 1.5 x 2.0 cm  Lateral tibial plateau: ICRS Grade 0      Size: none        Patellar surface: ICRS Grade 4A      Size: 3.5 x 4.0 cm  Trochlear groove: ICRS Grade 4A      Size: 3.5 x 4.0 cm        EXAMINATION UNDER ANESTHESIA:   Extension 5 degrees  Flexion 130 degrees  Lachman Maneuver:  Negative  Anterior Drawer: Negative  Pivot Shift: Negative  Posterior Drawer:  Negative  Varus stability @ 30 degrees: 0  Valgus stability @ 30 degrees: 0  Patellar glide:1 quadrant lateral, 2 quadrant medial    Severe lateral tilt noted      DESCRIPTION OF PROCEDURE: The patient was brought into the Operating Room and placed in supine position. Upon application of n block in the preoperative holding area, the patient underwent General to stabilize the airway. The patient was given the appropriate dose of antibiotics based on body weight. Timeout was utilized to verify the side as the operative side. Both upper extremities were placed in comfortable position. Examination under anesthesia was performed. The  nonoperative leg was carefully padded along the heel and peroneal nerve regions and then placed into a well padded well leg chappell. The operative leg was then placed into an arthroscopic leg chappell with a tourniquet applied proximally.  No bump was placed under the hip on the operative side. The operative leg was prepped and draped in a sterile fashion with ChloraPrep material.    We injected 0.5% ropivacaine mixture into the anterolateral and anteromedial aspect of the knee with application of 15 mL per portal site. A #11 blade was used to make the arthroscopic portals. Blunt trocar and sheath were inserted into the intercondylar notch and then subsequently into the suprapatellar pouch. This patellofemoral joint was visualized, demonstrating moderate lateral patellar tilt, moderate patellar subluxation. The patellar tracked laterally with flexion and extension. Arthroscopic pictures were obtained. There was articular cartilage damage was present in the patellofemoral compartment as noted in the Findings section of this operative report. The lesion if present was treated with arthroscopic chondroplasty technique removing all irregular edges and flaps of articular cartilage at the lesion.    Attention was turned to the intercondylar notch where the anterior cruciate ligament (ACL) and posterior cruciate ligament (PCL) structures were visualized. Visualization demonstrated an intact ACL and an intact PCL. Probe analysis revealed no signs of occult pathology within the ligamentous structures.     Attention was then turned to the lateral compartment. The patient demonstrated an intact lateral meniscus with probe analysis demonstrating no occult tears or pathology Arthroscopic instrumentation was used to veify no tear and pictures obtained. articular cartilage damage was present in the lateral compartment as noted in the Findings section of this operative report. The lesion if present was treated witharthroscopic  chondroplasty technique removing all irregular edges and flaps of articular cartilage at the lesion.    Attention was then turned to the medial compartment. The patient demonstrated a complex posterior horn and body type tear of the posterior horn and body as well as the central body region of the medial meniscus. Arthroscopic instrumentation was used to  carefully remove the tear and removing 50 % of the posterior horn and body as well as a portion of the central body region of the medial meniscus. The remaining meniscus structure medially was found to be intact. There was articular cartilage damage was present in the medial compartment as noted in the Findings section of this operative report. The lesion if present was treated with arthroscopic chondroplasty technique removing all irregular edges and flaps of articular cartilage at the lesion.    Arthroscopic major synovectomy was performed in the suprapatellar pouch, anterior medial and lateral as well as the medial gutter regions of the knee.    Arthroscopic loose bodies were removed from the suprapatellar pouch regions of the knee.     Final arthroscopic pictures were obtained. Fluid was extravasated from the joint. A 4-0 nylon sutures were used to close the arthroscopic portals. We then injected additional 0.5% ropivicaine mixture using 15 ml per   portal site and along then anterior portion of the knee. Amniox arthrocentesis was performed. Xeroform was applied along with application of sterile electrodes proximally and distally, gauze, ABD pads,cast padding, long-leg FRANK hose stocking and cooling unit. No immobilizer was required postoperatively for this patient. The patient was then allowed to recover from anesthesia.  General was removed. The patient was taken to Recovery Room in  Good  condition. At the completion case, all instrument and sponge counts were   correct.    NOTE: I was present and scrubbed for the key portions of the procedure.    PHYSICAL  THERAPY:  The patient should begin physical therapy on postoperative   day # 3 and will be advanced to outpatient therapy as soon as   Possible following discharge.  Weight bearing:as tolerated  left leg  Range of Motion:Full normal motion symmetric to opposite side    No CPM required due to procedure performed      Additional exercises to be performed are:   to begin quad sets with a heel roll to obtain hyperextension, straight leg raise and heel slides with the heel supported in a closed-chain fashion    Immediate specific exercises should include:   Gait program should include the above stated weightbearing; in addition: active extension with a heel-to-toe gait working on maintaining extension    Discharge summary:  The patient was discharged to home in Good  Follow-up as scheduled preoperatively.    Medication(s): Refer to Discharge Medication List         Resume preoperative diet as tolerated    Activity per outpatient discharge instruction sheet

## 2018-03-05 ENCOUNTER — CLINICAL SUPPORT (OUTPATIENT)
Dept: REHABILITATION | Facility: HOSPITAL | Age: 58
End: 2018-03-05
Attending: ORTHOPAEDIC SURGERY
Payer: COMMERCIAL

## 2018-03-05 DIAGNOSIS — M25.562 LEFT ANTERIOR KNEE PAIN: Primary | ICD-10-CM

## 2018-03-05 PROCEDURE — 97110 THERAPEUTIC EXERCISES: CPT | Mod: PN | Performed by: PHYSICAL THERAPIST

## 2018-03-05 NOTE — PROGRESS NOTES
Physical Therapy Daily Note     Date: 03/05/2018  Name: Virgilio Maher Jr.  Clinic Number: [id number]  Diagnosis: PROCEDURES(S) PERFORMED:   1. Left  Arthroscopy, debridement/shaving of articular cartilage (chondroplasty) 54687  2.  Left  Arthroscopy, with meniscectomy (medial OR lateral) 23609, medial  3.  Left  Arthroscopy, knee, for removal of loose body or foreign body 67095  4.  Left  Amniox Arthrocentesis, 03475  Encounter Diagnosis   Name Primary?    Left anterior knee pain Yes     Physician: Alfredito Babcock MD    Evaluation Date: 2/28/18  Date of Surgery: 3/1/18  Visit #:  3  Start Time:  11:30  Stop Time:  12:30  Total Treatment Time: 60    Precautions: PHYSICAL THERAPY:  The patient should begin physical therapy on postoperative   day # 3 and will be advanced to outpatient therapy as soon as   Possible following discharge.  Weight bearing:as tolerated  left leg  Range of Motion:Full normal motion symmetric to opposite side    Subjective     Pt reports the knee is sore today. Its not as straight today either.    Pain: 3/10    Objective     Measurements taken: none    Patient received individual therapy to increase strength, endurance and ROM with activities as follows:     Virgilio received therapeutic exercises to develop strength, endurance and ROM for 40 minutes including:   Seated extension x 5 min 3#  Quad set towel x 30  Quad set flat x 30  EOT flexion x 3 min  Seated extension x 5 min 3#  Standing TKEs x 3 min  6 in step ups 3x10  Gait with cones and without x 5 min      Virgilio received the following manual therapy techniques: Joint mobilizations and Soft tissue Mobilization were applied to the: left knee for 5 minutes.   PROM: ext  Patellar mobs        Written Home Exercises Provided: updated as per therex list  Pt demo good understanding of the education provided. Virgilio demonstrated good return demonstration of activities.     Education  provided:  Virgilio verbalized good understanding of education provided.   No spiritual or educational barriers to learning identified.    Assessment     Gained full extension with manual and aformentioned therex. Able to maintain end range extension with improved gait parameters with practice. Continued to progress functional loading and quad control.    This is a 57 y.o. male referred to outpatient physical therapy and presents with a medical diagnosis of left knee menisectomy and demonstrates limitations as described in the problem list Pt prognosis is Good. Pt will continue to benefit from skilled outpatient physical therapy to address the deficits listed below in the problem list, provide pt/family education and to maximize pt's level of independence in the home and community environment.    Will the patient continue to benefit from skilled PT intervention? yes        Medical necessity is demonstrated by:   - Pain limits function of effected part for all activities  - Unable to participate in daily activities   - Requires skilled supervision to complete and progress HEP  - Fall risk - impaired balance   - Continued inability to participate in vocational pursuits    Short Term Goals (6 Weeks):  - Pt will increase ROM of left knee = right knee  - Pt will increase strength of bilateral hip abd = 5/5  - Decrease Pain to 0/10 with ADLs  - Pt to self correct posture independently.  - Pt independent with HEP with progressions.      Long Term Goals (16-18 Weeks):  - Pt able to SLS without valgus 3x10  - Pt with normal loading mechanics with lunge walks.  - Pt with Ybalance = 94% of uninvolved.  - Pt with normalized jogging mechanics with 0/10 pain.       Plan   Continue with established Plan of Care towards PT goals.      Therapist: EDDIE MIMS, PT

## 2018-03-07 ENCOUNTER — OFFICE VISIT (OUTPATIENT)
Dept: SPORTS MEDICINE | Facility: CLINIC | Age: 58
End: 2018-03-07
Payer: COMMERCIAL

## 2018-03-07 VITALS
WEIGHT: 208 LBS | HEIGHT: 73 IN | HEART RATE: 62 BPM | DIASTOLIC BLOOD PRESSURE: 64 MMHG | SYSTOLIC BLOOD PRESSURE: 109 MMHG | BODY MASS INDEX: 27.57 KG/M2

## 2018-03-07 DIAGNOSIS — M25.562 ACUTE PAIN OF LEFT KNEE: Primary | ICD-10-CM

## 2018-03-07 DIAGNOSIS — M23.92 INTERNAL DERANGEMENT OF KNEE JOINT, LEFT: ICD-10-CM

## 2018-03-07 PROCEDURE — 99999 PR PBB SHADOW E&M-EST. PATIENT-LVL III: CPT | Mod: PBBFAC,,, | Performed by: ORTHOPAEDIC SURGERY

## 2018-03-07 PROCEDURE — 99024 POSTOP FOLLOW-UP VISIT: CPT | Mod: S$GLB,,, | Performed by: ORTHOPAEDIC SURGERY

## 2018-03-07 RX ORDER — CELECOXIB 200 MG/1
200 CAPSULE ORAL 2 TIMES DAILY
Qty: 60 CAPSULE | Refills: 2 | Status: SHIPPED | OUTPATIENT
Start: 2018-03-07 | End: 2018-04-06

## 2018-03-07 NOTE — PROGRESS NOTES
Subjective:          Chief Complaint: Virgilio Maher Jr. is a 57 y.o. male who had concerns including Post-op Evaluation of the Left Knee.    Patient is here for a follow up for his left knee. He is doing PT with Balwinder. He is doing better since he started taking the Celebrex and wearing the Jobst stocking.     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 Left  Arthroscopy, debridement/shaving of articular cartilage (chondroplasty) 99862  2.  Left  Arthroscopy, with meniscectomy (medial OR lateral) 26216, medial  3.  Left  Arthroscopy, knee, for removal of loose body or foreign body 45033  4.  Left  Amniox Arthrocentesis, 42304            Review of Systems   Constitution: Negative for fever and night sweats.   HENT: Negative for hearing loss.    Eyes: Negative for blurred vision and visual disturbance.   Cardiovascular: Negative for chest pain and leg swelling.   Respiratory: Negative for shortness of breath.    Endocrine: Negative for polyuria.   Hematologic/Lymphatic: Negative for bleeding problem.   Skin: Negative for rash.   Musculoskeletal: Negative for back pain, joint pain, joint swelling, muscle cramps and muscle weakness.   Gastrointestinal: Negative for melena.   Genitourinary: Negative for hematuria.   Neurological: Negative for loss of balance, numbness and paresthesias.   Psychiatric/Behavioral: Negative for altered mental status.       Pain Related  Questions  Over the past 3 days, what was your average pain during activity? (I.e. running, jogging, walking, climbing stairs, getting dressed, ect.): 6  Over the past 3 days, what was your highest pain level?: 6  Over the past 3 days, what was your lowest pain level? : 3    Other  How many nights a week are you awakened by your affected body part?: 0  Was the patient's HEIGHT measured or patient reported?: Patient Reported  Was the patient's WEIGHT measured or patient reported?: Measured      Objective:        General: Virgilio is well-developed, well-nourished, appears stated age, in no acute distress, alert and oriented to time, place and person.     General    Vitals reviewed.  Constitutional: He is oriented to person, place, and time. He appears well-developed and well-nourished. No distress.   HENT:   Mouth/Throat: No oropharyngeal exudate.   Eyes: Right eye exhibits no discharge. Left eye exhibits no discharge.   Neck: Normal range of motion.   Pulmonary/Chest: Effort normal and breath sounds normal. No respiratory distress.   Neurological: He is alert and oriented to person, place, and time. He has normal reflexes. No cranial nerve deficit. Coordination normal.   Psychiatric: He has a normal mood and affect. His behavior is normal. Judgment and thought content normal.     General Musculoskeletal Exam   Gait: antalgic       Right Knee Exam     Inspection   Erythema: absent  Scars: absent  Swelling: absent  Effusion: effusion  Deformity: deformity  Bruising: absent    Tenderness   The patient is experiencing no tenderness.         Range of Motion   Extension: 0   Flexion: 140     Tests   Meniscus   Sima:  Medial - negative Lateral - negative  Ligament Examination Lachman: normal (-1 to 2mm) PCL-Posterior Drawer: normal (0 to 2mm)     MCL - Valgus: normal (0 to 2mm)  LCL - Varus: normalPivot Shift: normal (Equal)Reverse Pivot Shift: normal (Equal)Dial Test at 30 degrees: normal (< 5 degrees)Dial Test at 90  degrees: normal (< 5 degrees)  Posterior Sag Test: negative  Posterolateral Corner: unstable (>15 degrees difference)  Patella   Patellar Apprehension: negative  Passive Patellar Tilt: neutral  Patellar Tracking: normal  Patellar Glide (quadrants): Lateral - 1   Medial - 2  Q-Angle at 90 degrees: normal  Patellar Grind: negative  J-Sign: none    Other   Meniscal Cyst: absent  Popliteal (Baker's) Cyst: absent  Sensation: normal    Left Knee Exam     Inspection   Erythema: absent  Scars: present  Swelling: present  Effusion: absent  Deformity: deformity  Bruising: absent    Tenderness   The patient tender to palpation of the medial joint line and patellar tendon.    Range of Motion   Extension:  5 abnormal   Flexion:  90 abnormal     Tests   Meniscus   Sima:  Medial - negative Lateral - negative  Stability Lachman: normal (-1 to 2mm) PCL-Posterior Drawer: normal (0 to 2mm)  MCL - Valgus: normal (0 to 2mm)  LCL - Varus: normal (0 to 2mm)Pivot Shift: normal (Equal)Reverse Pivot Shift: normal (Equal)Dial Test at 30 degrees: normal (< 5 degrees)Dial Test at 90 degrees: normal (< 5 degrees)  Posterior Sag Test: negative  Posterolateral Corner: unstable (>15 degrees difference)  Patella   Patellar Apprehension: negative  Passive Patellar Tilt: neutral  Patellar Tracking: normal  Patellar Glide (Quadrants): Lateral - 1 Medial - 2  Q-Angle at 90 degrees: normal  Patellar Grind: negative  J-Sign: J sign absent    Other   Meniscal Cyst: absent  Popliteal (Baker's) Cyst: absent  Sensation: normal    Comments:  Portal sutures retained    Right Hip Exam     Tests   Samira: negative  Left Hip Exam     Tests   Samira: negative          Reflexes     Left Side  Quadriceps:  2+  Achilles:  2+    Right Side   Quadriceps:  2+  Achilles:  2+    Vascular Exam     Right Pulses  Dorsalis Pedis:      2+  Posterior Tibial:      2+        Left Pulses  Dorsalis Pedis:      2+  Posterior Tibial:      2+                    Assessment:        Encounter Diagnoses   Name Primary?    Acute pain of left knee Yes    Internal derangement of knee joint, left           Plan:       1. IKDC, SF-12 and KOOS was not filled out today in clinic.     RTC in 1 weeks with Dr. Barbara Leija for suture removal Patient will not fill out IKDC, SF-12 and KOOS on return.    2. Continue PT with Balwinder  Focus on full extension  Prone hangs demonstrated today  Stationary bicycle    3. Dynasplint for full extension ordered today    4. Continue Celebrex 200mg BID - refilled today -  and Jobst stocking for swelling                        [facility] patient questionnaires have been collected today.

## 2018-03-08 ENCOUNTER — CLINICAL SUPPORT (OUTPATIENT)
Dept: REHABILITATION | Facility: HOSPITAL | Age: 58
End: 2018-03-08
Attending: ORTHOPAEDIC SURGERY
Payer: COMMERCIAL

## 2018-03-08 DIAGNOSIS — M25.562 LEFT ANTERIOR KNEE PAIN: Primary | ICD-10-CM

## 2018-03-08 PROCEDURE — 97110 THERAPEUTIC EXERCISES: CPT | Mod: PN | Performed by: PHYSICAL THERAPIST

## 2018-03-08 NOTE — PROGRESS NOTES
Physical Therapy Daily Note     Date: 03/08/2018  Name: Virgilio Maher Jr.  Clinic Number: [id number]  Diagnosis: PROCEDURES(S) PERFORMED:   1. Left  Arthroscopy, debridement/shaving of articular cartilage (chondroplasty) 89723  2.  Left  Arthroscopy, with meniscectomy (medial OR lateral) 66392, medial  3.  Left  Arthroscopy, knee, for removal of loose body or foreign body 57221  4.  Left  Amniox Arthrocentesis, 33210  Encounter Diagnosis   Name Primary?    Left anterior knee pain Yes     Physician: Alfredito Babcock MD    Evaluation Date: 2/28/18  Date of Surgery: 3/1/18  Visit #:  4  Start Time:  11:00  Stop Time:  12:00  Total Treatment Time: 60    Precautions: PHYSICAL THERAPY:  The patient should begin physical therapy on postoperative   day # 3 and will be advanced to outpatient therapy as soon as   Possible following discharge.  Weight bearing:as tolerated  left leg  Range of Motion:Full normal motion symmetric to opposite side    Subjective     Pt reports the knee is doing well.    Pain: 1/10    Objective     Measurements taken: none    Patient received individual therapy to increase strength, endurance and ROM with activities as follows:     Virgilio received therapeutic exercises to develop strength, endurance and ROM for 40 minutes including:   Seated extension x 5 min 3#  Quad set towel x 30  Quad set flat x 30  EOT flexion x 3 min  Seated extension x 5 min 3#  Standing TKEs x 3 min          Virgilio received the following manual therapy techniques: Joint mobilizations and Soft tissue Mobilization were applied to the: left knee for 5 minutes.   PROM: ext  Patellar mobs        Written Home Exercises Provided: updated as per therex list  Pt demo good understanding of the education provided. Virgilio demonstrated good return demonstration of activities.     Education provided:  Virgilio verbalized good understanding of education provided.   No spiritual or  educational barriers to learning identified.    Assessment     Increased fluid with mild loss of extension. Pt to see Dr Leija tomorrow and will follow up with PT after.    This is a 57 y.o. male referred to outpatient physical therapy and presents with a medical diagnosis of left knee menisectomy and demonstrates limitations as described in the problem list Pt prognosis is Good. Pt will continue to benefit from skilled outpatient physical therapy to address the deficits listed below in the problem list, provide pt/family education and to maximize pt's level of independence in the home and community environment.    Will the patient continue to benefit from skilled PT intervention? yes        Medical necessity is demonstrated by:   - Pain limits function of effected part for all activities  - Unable to participate in daily activities   - Requires skilled supervision to complete and progress HEP  - Fall risk - impaired balance   - Continued inability to participate in vocational pursuits    Short Term Goals (6 Weeks):  - Pt will increase ROM of left knee = right knee  - Pt will increase strength of bilateral hip abd = 5/5  - Decrease Pain to 0/10 with ADLs  - Pt to self correct posture independently.  - Pt independent with HEP with progressions.      Long Term Goals (16-18 Weeks):  - Pt able to SLS without valgus 3x10  - Pt with normal loading mechanics with lunge walks.  - Pt with Ybalance = 94% of uninvolved.  - Pt with normalized jogging mechanics with 0/10 pain.       Plan   Continue with established Plan of Care towards PT goals.      Therapist: EDDIE MIMS, PT

## 2018-03-09 ENCOUNTER — CLINICAL SUPPORT (OUTPATIENT)
Dept: REHABILITATION | Facility: HOSPITAL | Age: 58
End: 2018-03-09
Attending: ORTHOPAEDIC SURGERY
Payer: COMMERCIAL

## 2018-03-09 DIAGNOSIS — M25.562 LEFT ANTERIOR KNEE PAIN: Primary | ICD-10-CM

## 2018-03-09 PROCEDURE — 97110 THERAPEUTIC EXERCISES: CPT | Mod: PN | Performed by: PHYSICAL THERAPIST

## 2018-03-09 NOTE — PROGRESS NOTES
Physical Therapy Daily Note     Date: 03/09/2018  Name: Virgilio Maher Jr.  Clinic Number: [id number]  Diagnosis: PROCEDURES(S) PERFORMED:   1. Left  Arthroscopy, debridement/shaving of articular cartilage (chondroplasty) 05237  2.  Left  Arthroscopy, with meniscectomy (medial OR lateral) 67563, medial  3.  Left  Arthroscopy, knee, for removal of loose body or foreign body 40090  4.  Left  Amniox Arthrocentesis, 98610  Encounter Diagnosis   Name Primary?    Left anterior knee pain Yes     Physician: Alfredito Babcock MD    Evaluation Date: 2/28/18  Date of Surgery: 3/1/18  Visit #:  4  Start Time:  9:00  Stop Time:  10:00  Total Treatment Time: 60    Precautions: PHYSICAL THERAPY:  The patient should begin physical therapy on postoperative   day # 3 and will be advanced to outpatient therapy as soon as   Possible following discharge.  Weight bearing:as tolerated  left leg  Range of Motion:Full normal motion symmetric to opposite side    Subjective     Pt reports the knee is doing much better today. Heladio Gan gave me some meds and the fluid has gone down.    Pain: 1/10    Objective     Measurements taken: none    Patient received individual therapy to increase strength, endurance and ROM with activities as follows:     Virgilio received therapeutic exercises to develop strength, endurance and ROM for 40 minutes including:   Seated extension x 5 min 3#  Quad set towel x 30  Quad set flat x 30  EOT flexion x 3 min  Seated extension x 5 min 3#  Standing TKEs x 3 min  6 in step ups 3x10  Bike x 5 min      Virgilio received the following manual therapy techniques: Joint mobilizations and Soft tissue Mobilization were applied to the: left knee for 5 minutes.   PROM: ext  Patellar mobs        Written Home Exercises Provided: updated as per therex list  Pt demo good understanding of the education provided. Virgilio demonstrated good return demonstration of activities.      Education provided:  Virgilio verbalized good understanding of education provided.   No spiritual or educational barriers to learning identified.    Assessment     Significant decrease in swelling with improved global knee ROM. Regained full extension after bike. Requested that pt return to crutches to keep swelling minimal. Continue to progress appropriate quad firing.    This is a 57 y.o. male referred to outpatient physical therapy and presents with a medical diagnosis of left knee menisectomy and demonstrates limitations as described in the problem list Pt prognosis is Good. Pt will continue to benefit from skilled outpatient physical therapy to address the deficits listed below in the problem list, provide pt/family education and to maximize pt's level of independence in the home and community environment.    Will the patient continue to benefit from skilled PT intervention? yes        Medical necessity is demonstrated by:   - Pain limits function of effected part for all activities  - Unable to participate in daily activities   - Requires skilled supervision to complete and progress HEP  - Fall risk - impaired balance   - Continued inability to participate in vocational pursuits    Short Term Goals (6 Weeks):  - Pt will increase ROM of left knee = right knee  - Pt will increase strength of bilateral hip abd = 5/5  - Decrease Pain to 0/10 with ADLs  - Pt to self correct posture independently.  - Pt independent with HEP with progressions.      Long Term Goals (16-18 Weeks):  - Pt able to SLS without valgus 3x10  - Pt with normal loading mechanics with lunge walks.  - Pt with Ybalance = 94% of uninvolved.  - Pt with normalized jogging mechanics with 0/10 pain.       Plan   Continue with established Plan of Care towards PT goals.      Therapist: EDDIE MIMS, PT

## 2018-03-12 ENCOUNTER — CLINICAL SUPPORT (OUTPATIENT)
Dept: REHABILITATION | Facility: HOSPITAL | Age: 58
End: 2018-03-12
Attending: ORTHOPAEDIC SURGERY
Payer: COMMERCIAL

## 2018-03-12 DIAGNOSIS — M25.562 LEFT ANTERIOR KNEE PAIN: Primary | ICD-10-CM

## 2018-03-12 PROCEDURE — 97140 MANUAL THERAPY 1/> REGIONS: CPT | Mod: PN | Performed by: PHYSICAL THERAPIST

## 2018-03-12 PROCEDURE — 97110 THERAPEUTIC EXERCISES: CPT | Mod: PN | Performed by: PHYSICAL THERAPIST

## 2018-03-12 NOTE — PROGRESS NOTES
Physical Therapy Daily Note     Date: 03/12/2018  Name: Virgilio Maher Jr.  Clinic Number: [id number]  Diagnosis: PROCEDURES(S) PERFORMED:   1. Left  Arthroscopy, debridement/shaving of articular cartilage (chondroplasty) 09943  2.  Left  Arthroscopy, with meniscectomy (medial OR lateral) 02593, medial  3.  Left  Arthroscopy, knee, for removal of loose body or foreign body 02276  4.  Left  Amniox Arthrocentesis, 12235  Encounter Diagnosis   Name Primary?    Left anterior knee pain Yes     Physician: Alfredito Babcock MD    Evaluation Date: 2/28/18  Date of Surgery: 3/1/18  Visit #:  5  Start Time:  9:00  Stop Time:  10:00  Total Treatment Time: 60    Precautions: PHYSICAL THERAPY:  The patient should begin physical therapy on postoperative   day # 3 and will be advanced to outpatient therapy as soon as   Possible following discharge.  Weight bearing:as tolerated  left leg  Range of Motion:Full normal motion symmetric to opposite side    Subjective     Pt reports the knee is feeling stiff but Cindy kept me extension.     Pain: 1/10    Objective     Measurements taken: none    Patient received individual therapy to increase strength, endurance and ROM with activities as follows:     Virgilio received therapeutic exercises to develop strength, endurance and ROM for 40 minutes including:   Seated extension x 5 min 3#  Quad set towel x 30  Quad set flat x 30  EOT flexion x 3 min  Seated extension x 5 min 3#  Standing TKEs x 3 min  6 in step ups 3x10  EOT flexion x 3 min  Shuttle DL 2c 3x10  Shuttle SL 1c 3x10  Bike x 5 min      Virgilio received the following manual therapy techniques: Joint mobilizations and Soft tissue Mobilization were applied to the: left knee for 5 minutes.   PROM: ext  Patellar mobs        Written Home Exercises Provided: updated as per therex list  Pt demo good understanding of the education provided. Virgilio demonstrated good return demonstration of  activities.     Education provided:  Virgilio verbalized good understanding of education provided.   No spiritual or educational barriers to learning identified.    Assessment     Quad control and global left knee ROM continue to improve with rx. Continue to progress quad firing and maintenance of extension.    This is a 57 y.o. male referred to outpatient physical therapy and presents with a medical diagnosis of left knee menisectomy and demonstrates limitations as described in the problem list Pt prognosis is Good. Pt will continue to benefit from skilled outpatient physical therapy to address the deficits listed below in the problem list, provide pt/family education and to maximize pt's level of independence in the home and community environment.    Will the patient continue to benefit from skilled PT intervention? yes        Medical necessity is demonstrated by:   - Pain limits function of effected part for all activities  - Unable to participate in daily activities   - Requires skilled supervision to complete and progress HEP  - Fall risk - impaired balance   - Continued inability to participate in vocational pursuits    Short Term Goals (6 Weeks):  - Pt will increase ROM of left knee = right knee  - Pt will increase strength of bilateral hip abd = 5/5  - Decrease Pain to 0/10 with ADLs  - Pt to self correct posture independently.  - Pt independent with HEP with progressions.      Long Term Goals (16-18 Weeks):  - Pt able to SLS without valgus 3x10  - Pt with normal loading mechanics with lunge walks.  - Pt with Ybalance = 94% of uninvolved.  - Pt with normalized jogging mechanics with 0/10 pain.       Plan   Continue with established Plan of Care towards PT goals.      Therapist: EDDIE MIMS, PT

## 2018-03-14 ENCOUNTER — CLINICAL SUPPORT (OUTPATIENT)
Dept: REHABILITATION | Facility: HOSPITAL | Age: 58
End: 2018-03-14
Attending: ORTHOPAEDIC SURGERY
Payer: COMMERCIAL

## 2018-03-14 DIAGNOSIS — M25.562 LEFT ANTERIOR KNEE PAIN: Primary | ICD-10-CM

## 2018-03-14 PROCEDURE — 97110 THERAPEUTIC EXERCISES: CPT | Mod: PN | Performed by: PHYSICAL THERAPIST

## 2018-03-14 NOTE — PROGRESS NOTES
Physical Therapy Daily Note     Date: 03/14/2018  Name: Virgilio Maher Jr.  Clinic Number: [id number]  Diagnosis: PROCEDURES(S) PERFORMED:   1. Left  Arthroscopy, debridement/shaving of articular cartilage (chondroplasty) 98736  2.  Left  Arthroscopy, with meniscectomy (medial OR lateral) 75819, medial  3.  Left  Arthroscopy, knee, for removal of loose body or foreign body 05726  4.  Left  Amniox Arthrocentesis, 21106  Encounter Diagnosis   Name Primary?    Left anterior knee pain Yes     Physician: Alfredito Babcock MD    Evaluation Date: 2/28/18  Date of Surgery: 3/1/18  Visit #:  6  Start Time:  9:30  Stop Time:  10:30  Total Treatment Time: 60    Precautions: PHYSICAL THERAPY:  The patient should begin physical therapy on postoperative   day # 3 and will be advanced to outpatient therapy as soon as   Possible following discharge.  Weight bearing:as tolerated  left leg  Range of Motion:Full normal motion symmetric to opposite side    Subjective     Pt reports the knee is getting better.     Pain: 1/10    Objective     Measurements taken: none    Patient received individual therapy to increase strength, endurance and ROM with activities as follows:     Virgilio received therapeutic exercises to develop strength, endurance and ROM for 40 minutes including:   Seated extension x 5 min 3#  Quad set towel x 30  Quad set flat x 30  EOT flexion x 3 min  Seated extension x 5 min 3#  Standing TKEs x 3 min  6 in step ups 3x10  EOT flexion x 3 min  Shuttle DL 2c 3x10  Shuttle SL 1c 3x10  Bike x 5 min  Gait with cones and without x 5 min    Virgilio received the following manual therapy techniques: Joint mobilizations and Soft tissue Mobilization were applied to the: left knee for 5 minutes.   PROM: ext  Patellar mobs        Written Home Exercises Provided: updated as per therex list  Pt demo good understanding of the education provided. Virgilio demonstrated good return  demonstration of activities.     Education provided:  Virgilio verbalized good understanding of education provided.   No spiritual or educational barriers to learning identified.    Assessment     Quad control and maintenance of knee extension continue to improve. Able to amb with appropriate quad control and full knee extension after (measured on mat).    This is a 57 y.o. male referred to outpatient physical therapy and presents with a medical diagnosis of left knee menisectomy and demonstrates limitations as described in the problem list Pt prognosis is Good. Pt will continue to benefit from skilled outpatient physical therapy to address the deficits listed below in the problem list, provide pt/family education and to maximize pt's level of independence in the home and community environment.    Will the patient continue to benefit from skilled PT intervention? yes        Medical necessity is demonstrated by:   - Pain limits function of effected part for all activities  - Unable to participate in daily activities   - Requires skilled supervision to complete and progress HEP  - Fall risk - impaired balance   - Continued inability to participate in vocational pursuits    Short Term Goals (6 Weeks):  - Pt will increase ROM of left knee = right knee  - Pt will increase strength of bilateral hip abd = 5/5  - Decrease Pain to 0/10 with ADLs  - Pt to self correct posture independently.  - Pt independent with HEP with progressions.      Long Term Goals (16-18 Weeks):  - Pt able to SLS without valgus 3x10  - Pt with normal loading mechanics with lunge walks.  - Pt with Ybalance = 94% of uninvolved.  - Pt with normalized jogging mechanics with 0/10 pain.       Plan   Continue with established Plan of Care towards PT goals.      Therapist: EDDIE MIMS, PT

## 2018-03-16 ENCOUNTER — CLINICAL SUPPORT (OUTPATIENT)
Dept: REHABILITATION | Facility: HOSPITAL | Age: 58
End: 2018-03-16
Attending: ORTHOPAEDIC SURGERY
Payer: COMMERCIAL

## 2018-03-16 DIAGNOSIS — M25.562 LEFT ANTERIOR KNEE PAIN: Primary | ICD-10-CM

## 2018-03-16 PROCEDURE — 97110 THERAPEUTIC EXERCISES: CPT | Mod: PN | Performed by: PHYSICAL THERAPIST

## 2018-03-16 NOTE — PROGRESS NOTES
Physical Therapy Daily Note     Date: 03/16/2018  Name: Virgilio Maher Jr.  Clinic Number: [id number]  Diagnosis: PROCEDURES(S) PERFORMED:   1. Left  Arthroscopy, debridement/shaving of articular cartilage (chondroplasty) 41587  2.  Left  Arthroscopy, with meniscectomy (medial OR lateral) 25688, medial  3.  Left  Arthroscopy, knee, for removal of loose body or foreign body 34720  4.  Left  Amniox Arthrocentesis, 69558  Encounter Diagnosis   Name Primary?    Left anterior knee pain Yes     Physician: Alfredito Babcock MD    Evaluation Date: 2/28/18  Date of Surgery: 3/1/18  Visit #:  7  Start Time:  9:00  Stop Time:  10:00  Total Treatment Time: 60    Precautions: PHYSICAL THERAPY:  The patient should begin physical therapy on postoperative   day # 3 and will be advanced to outpatient therapy as soon as   Possible following discharge.  Weight bearing:as tolerated  left leg  Range of Motion:Full normal motion symmetric to opposite side    Subjective     Pt reports the knee is feeling much better. It still gets stiff but it is better.     Pain: 1/10    Objective     Measurements taken: none    Patient received individual therapy to increase strength, endurance and ROM with activities as follows:     Virgilio received therapeutic exercises to develop strength, endurance and ROM for 40 minutes including:   Seated extension x 5 min 3#  Quad set towel x 30  Quad set flat x 30  EOT flexion x 3 min  Seated extension x 5 min 3#  Standing TKEs x 3 min  6 in step ups 3x10  EOT flexion x 3 min  Shuttle DL 2c 3x10  Shuttle SL 1c 3x10  Bike x 5 min (level 2)  Heel slides x 3 min  Extension series x 5 min    Virgilio received the following manual therapy techniques: Joint mobilizations and Soft tissue Mobilization were applied to the: left knee for 5 minutes.   PROM: ext  Patellar mobs        Written Home Exercises Provided: updated as per therex list  Pt demo good understanding of  the education provided. Virgilio demonstrated good return demonstration of activities.     Education provided:  Virgilio verbalized good understanding of education provided.   No spiritual or educational barriers to learning identified.    Assessment     Knee flexion improved to > 120 with bike and heel slides. Able to regain extension after flexion work with relative ease. Continue to progress functional quad strength and end range motion recovery.    This is a 57 y.o. male referred to outpatient physical therapy and presents with a medical diagnosis of left knee menisectomy and demonstrates limitations as described in the problem list Pt prognosis is Good. Pt will continue to benefit from skilled outpatient physical therapy to address the deficits listed below in the problem list, provide pt/family education and to maximize pt's level of independence in the home and community environment.    Will the patient continue to benefit from skilled PT intervention? yes        Medical necessity is demonstrated by:   - Pain limits function of effected part for all activities  - Unable to participate in daily activities   - Requires skilled supervision to complete and progress HEP  - Fall risk - impaired balance   - Continued inability to participate in vocational pursuits    Short Term Goals (6 Weeks):  - Pt will increase ROM of left knee = right knee  - Pt will increase strength of bilateral hip abd = 5/5  - Decrease Pain to 0/10 with ADLs  - Pt to self correct posture independently.  - Pt independent with HEP with progressions.      Long Term Goals (16-18 Weeks):  - Pt able to SLS without valgus 3x10  - Pt with normal loading mechanics with lunge walks.  - Pt with Ybalance = 94% of uninvolved.  - Pt with normalized jogging mechanics with 0/10 pain.       Plan   Continue with established Plan of Care towards PT goals.      Therapist: EDDIE MIMS, PT

## 2018-03-19 ENCOUNTER — OFFICE VISIT (OUTPATIENT)
Dept: SPORTS MEDICINE | Facility: CLINIC | Age: 58
End: 2018-03-19
Payer: COMMERCIAL

## 2018-03-19 ENCOUNTER — CLINICAL SUPPORT (OUTPATIENT)
Dept: REHABILITATION | Facility: HOSPITAL | Age: 58
End: 2018-03-19
Attending: ORTHOPAEDIC SURGERY
Payer: COMMERCIAL

## 2018-03-19 VITALS
DIASTOLIC BLOOD PRESSURE: 68 MMHG | HEIGHT: 73 IN | HEART RATE: 60 BPM | WEIGHT: 208 LBS | BODY MASS INDEX: 27.57 KG/M2 | SYSTOLIC BLOOD PRESSURE: 112 MMHG

## 2018-03-19 DIAGNOSIS — M25.562 ACUTE PAIN OF LEFT KNEE: ICD-10-CM

## 2018-03-19 DIAGNOSIS — M25.562 LEFT ANTERIOR KNEE PAIN: Primary | ICD-10-CM

## 2018-03-19 DIAGNOSIS — M23.92 INTERNAL DERANGEMENT OF KNEE JOINT, LEFT: Primary | ICD-10-CM

## 2018-03-19 PROCEDURE — 99999 PR PBB SHADOW E&M-EST. PATIENT-LVL III: CPT | Mod: PBBFAC,,, | Performed by: ORTHOPAEDIC SURGERY

## 2018-03-19 PROCEDURE — 99024 POSTOP FOLLOW-UP VISIT: CPT | Mod: S$GLB,,, | Performed by: ORTHOPAEDIC SURGERY

## 2018-03-19 PROCEDURE — 97110 THERAPEUTIC EXERCISES: CPT | Mod: PN | Performed by: PHYSICAL THERAPIST

## 2018-03-19 NOTE — PROGRESS NOTES
Subjective:          Chief Complaint: Virgilio Maher Jr. is a 57 y.o. male who had concerns including Follow-up of the Left Knee.    Patient is here for a follow up for his left knee. He is doing PT with Balwinder.     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 Left  Arthroscopy, debridement/shaving of articular cartilage (chondroplasty) 05837  2.  Left  Arthroscopy, with meniscectomy (medial OR lateral) 24383, medial  3.  Left  Arthroscopy, knee, for removal of loose body or foreign body 08190  4.  Left  Amniox Arthrocentesis, 58209            Review of Systems   Constitution: Negative for fever and night sweats.   HENT: Negative for hearing loss.    Eyes: Negative for blurred vision and visual disturbance.   Cardiovascular: Negative for chest pain and leg swelling.   Respiratory: Negative for shortness of breath.    Endocrine: Negative for polyuria.   Hematologic/Lymphatic: Negative for bleeding problem.   Skin: Negative for rash.   Musculoskeletal: Negative for back pain, joint pain, joint swelling, muscle cramps and muscle weakness.   Gastrointestinal: Negative for melena.   Genitourinary: Negative for hematuria.   Neurological: Negative for loss of balance, numbness and paresthesias.   Psychiatric/Behavioral: Negative for altered mental status.       Pain Related Questions  Over the past 3 days, what was your average pain during activity? (I.e. running,  jogging, walking, climbing stairs, getting dressed, ect.): 0  Over the past 3 days, what was your highest pain level?: 0  Over the past 3 days, what was your lowest pain level? : 0    Other  How many nights a week are you awakened by your affected body part?: 0  Was the patient's HEIGHT measured or patient reported?: Patient Reported  Was the patient's WEIGHT measured or patient reported?: Measured      Objective:        General: Virgilio is well-developed, well-nourished, appears stated age, in no acute distress, alert and oriented to time, place and person.     General    Vitals reviewed.  Constitutional: He is oriented to person, place, and time. He appears well-developed and well-nourished. No distress.   HENT:   Mouth/Throat: No oropharyngeal exudate.   Eyes: Right eye exhibits no discharge. Left eye exhibits no discharge.   Neck: Normal range of motion.   Pulmonary/Chest: Effort normal and breath sounds normal. No respiratory distress.   Neurological: He is alert and oriented to person, place, and time. He has normal reflexes. No cranial nerve deficit. Coordination normal.   Psychiatric: He has a normal mood and affect. His behavior is normal. Judgment and thought content normal.     General Musculoskeletal Exam   Gait: normal       Right Knee Exam     Inspection   Erythema: absent  Scars: absent  Swelling: absent  Effusion: effusion  Deformity: deformity  Bruising: absent    Tenderness   The patient is experiencing no tenderness.         Range of Motion   Extension: 0   Flexion: 130     Tests   Meniscus   Sima:  Medial - negative Lateral - negative  Ligament Examination Lachman: normal (-1 to 2mm) PCL-Posterior Drawer: normal (0 to 2mm)     MCL - Valgus: normal (0 to 2mm)  LCL - Varus: normalPivot Shift: normal (Equal)Reverse Pivot Shift: normal (Equal)Dial Test at 30 degrees: normal (< 5 degrees)Dial Test at 90 degrees: normal (< 5 degrees)  Posterior Sag Test: negative  Posterolateral Corner: unstable  (>15 degrees difference)  Patella   Patellar Apprehension: negative  Passive Patellar Tilt: neutral  Patellar Tracking: normal  Patellar Glide (quadrants): Lateral - 1   Medial - 2  Q-Angle at 90 degrees: normal  Patellar Grind: negative  J-Sign: none    Other   Meniscal Cyst: absent  Popliteal (Baker's) Cyst: absent  Sensation: normal    Left Knee Exam     Inspection   Erythema: absent  Scars: present  Swelling: absent  Effusion: absent  Deformity: deformity  Bruising: absent    Tenderness   The patient tender to palpation of the medial joint line and patellar tendon.    Range of Motion   Extension:  0 normal   Flexion: abnormal Left knee flexion: 125.     Tests   Meniscus   Sima:  Medial - negative Lateral - negative  Stability Lachman: normal (-1 to 2mm) PCL-Posterior Drawer: normal (0 to 2mm)  MCL - Valgus: normal (0 to 2mm)  LCL - Varus: normal (0 to 2mm)Pivot Shift: normal (Equal)Reverse Pivot Shift: normal (Equal)Dial Test at 30 degrees: normal (< 5 degrees)Dial Test at 90 degrees: normal (< 5 degrees)  Posterior Sag Test: negative  Posterolateral Corner: unstable (>15 degrees difference)  Patella   Patellar Apprehension: negative  Passive Patellar Tilt: neutral  Patellar Tracking: normal  Patellar Glide (Quadrants): Lateral - 1 Medial - 2  Q-Angle at 90 degrees: normal  Patellar Grind: negative  J-Sign: J sign absent    Other   Meniscal Cyst: absent  Popliteal (Baker's) Cyst: absent  Sensation: normal    Right Hip Exam     Tests   Samira: negative  Left Hip Exam     Tests   Samira: negative          Reflexes     Left Side  Quadriceps:  2+  Achilles:  2+    Right Side   Quadriceps:  2+  Achilles:  2+    Vascular Exam     Right Pulses  Dorsalis Pedis:      2+  Posterior Tibial:      2+        Left Pulses  Dorsalis Pedis:      2+  Posterior Tibial:      2+                    Assessment:       Encounter Diagnoses   Name Primary?    Internal derangement of knee joint, left Yes    Acute pain of left knee            Plan:       1. IKDC, SF-12 and KOOS was not filled out today in clinic.     RTC in 4 weeks with Dr. Barbara Leija. Patient will not fill out IKDC, SF-12 and KOOS on return.    2. Continue PT with Balwinder  Bike, elliptical    3. Continue Dynasplint for full extension ordered today    4. Continue Celebrex 200mg BID - refilled today -  and Jobst stocking for swelling    5. Injection Procedure left knee DONOR ID [id number]    After time out was performed, including verification of patient ID, procedure, site and side, availability of information and equipment, review of safety issues, and agreement with consent, the procedure site was marked and the patient was prepped aseptically. A diagnostic and therapeutic injection of 2cc AMNIOX and ethyl chloride spray was given under sterile technique using a 22g x 1.5 needle into the left Knee Joint in seated position.     The patient had no adverse reactions to the medication. Pain decreased. The patient was instructed to apply ice to the joint for 20 minutes and avoid strenuous activities for 24-36 hours following the injection. Patient was warned of possible blood sugar and/or blood pressure changes during that time. Following that time, patient can resume regular activities.    Patient was reminded to call the clinic immediately for any adverse side effects as explained in clinic today.    6. 91359 - Leo, performed a custom orthotic / brace adjustment, fitting and training with the patient. The patient demonstrated understanding and proper care. This was performed for 15 minutes. OA lite  brace                      [facility] patient questionnaires have been collected today.

## 2018-03-20 NOTE — PROGRESS NOTES
Physical Therapy Daily Note     Date: 03/20/2018  Name: Virgilio Maher Jr.  Clinic Number: [id number]  Diagnosis: PROCEDURES(S) PERFORMED:   1. Left  Arthroscopy, debridement/shaving of articular cartilage (chondroplasty) 83409  2.  Left  Arthroscopy, with meniscectomy (medial OR lateral) 91024, medial  3.  Left  Arthroscopy, knee, for removal of loose body or foreign body 66493  4.  Left  Amniox Arthrocentesis, 26424  Encounter Diagnosis   Name Primary?    Left anterior knee pain Yes     Physician: Alfredito Bacbock MD    Evaluation Date: 2/28/18  Date of Surgery: 3/1/18  Visit #:  8  Start Time:  2:30  Stop Time:  3:30  Total Treatment Time: 60    Precautions: PHYSICAL THERAPY:  The patient should begin physical therapy on postoperative   day # 3 and will be advanced to outpatient therapy as soon as   Possible following discharge.  Weight bearing:as tolerated  left leg  Range of Motion:Full normal motion symmetric to opposite side    Subjective     Pt reports the knee is feeling much better. Dr Leija bent it to 125 and it is staying straight.     Pain: 1/10    Objective     Measurements taken: none    Patient received individual therapy to increase strength, endurance and ROM with activities as follows:     Virgilio received therapeutic exercises to develop strength, endurance and ROM for 40 minutes including:   Seated extension x 5 min 3#  Quad set towel x 30  Quad set flat x 30  EOT flexion x 3 min  Seated extension x 5 min 3#  Standing TKEs x 3 min  6 in step ups 3x10  EOT flexion x 3 min  Shuttle DL 2c 3x10  Shuttle SL 1c 3x10  Bike x 5 min (level 2)  Heel slides x 3 min  Extension series x 5 min    Virgilio received the following manual therapy techniques: Joint mobilizations and Soft tissue Mobilization were applied to the: left knee for 5 minutes.   PROM: ext  Patellar mobs        Written Home Exercises Provided: updated as per therex list  Pt demo good  understanding of the education provided. Virgilio demonstrated good return demonstration of activities.     Education provided:  Virgilio verbalized good understanding of education provided.   No spiritual or educational barriers to learning identified.    Assessment     Quad control and functional ROM continue to improve with rx. Continue to progress as tolerated.     This is a 57 y.o. male referred to outpatient physical therapy and presents with a medical diagnosis of left knee menisectomy and demonstrates limitations as described in the problem list Pt prognosis is Good. Pt will continue to benefit from skilled outpatient physical therapy to address the deficits listed below in the problem list, provide pt/family education and to maximize pt's level of independence in the home and community environment.    Will the patient continue to benefit from skilled PT intervention? yes        Medical necessity is demonstrated by:   - Pain limits function of effected part for all activities  - Unable to participate in daily activities   - Requires skilled supervision to complete and progress HEP  - Fall risk - impaired balance   - Continued inability to participate in vocational pursuits    Short Term Goals (6 Weeks):  - Pt will increase ROM of left knee = right knee  - Pt will increase strength of bilateral hip abd = 5/5  - Decrease Pain to 0/10 with ADLs  - Pt to self correct posture independently.  - Pt independent with HEP with progressions.      Long Term Goals (16-18 Weeks):  - Pt able to SLS without valgus 3x10  - Pt with normal loading mechanics with lunge walks.  - Pt with Ybalance = 94% of uninvolved.  - Pt with normalized jogging mechanics with 0/10 pain.       Plan   Continue with established Plan of Care towards PT goals.      Therapist: EDDIE MIMS, PT

## 2018-03-22 ENCOUNTER — CLINICAL SUPPORT (OUTPATIENT)
Dept: REHABILITATION | Facility: HOSPITAL | Age: 58
End: 2018-03-22
Attending: ORTHOPAEDIC SURGERY
Payer: COMMERCIAL

## 2018-03-22 DIAGNOSIS — M25.562 LEFT ANTERIOR KNEE PAIN: Primary | ICD-10-CM

## 2018-03-22 PROCEDURE — 97110 THERAPEUTIC EXERCISES: CPT | Mod: PN | Performed by: PHYSICAL THERAPIST

## 2018-03-22 NOTE — PROGRESS NOTES
Physical Therapy Daily Note     Date: 03/22/2018  Name: Virgilio Maher Jr.  Clinic Number: [id number]  Diagnosis: PROCEDURES(S) PERFORMED:   1. Left  Arthroscopy, debridement/shaving of articular cartilage (chondroplasty) 58735  2.  Left  Arthroscopy, with meniscectomy (medial OR lateral) 98521, medial  3.  Left  Arthroscopy, knee, for removal of loose body or foreign body 44084  4.  Left  Amniox Arthrocentesis, 39351  Encounter Diagnosis   Name Primary?    Left anterior knee pain Yes     Physician: Alfredito Babcock MD    Evaluation Date: 2/28/18  Date of Surgery: 3/1/18  Visit #:  9  Start Time:  10:00  Stop Time:  11:00  Total Treatment Time: 60    Precautions: PHYSICAL THERAPY:  The patient should begin physical therapy on postoperative   day # 3 and will be advanced to outpatient therapy as soon as   Possible following discharge.  Weight bearing:as tolerated  left leg  Range of Motion:Full normal motion symmetric to opposite side    Subjective     Pt reports the knee is a little swollen but it is straight.     Pain: 1/10    Objective     Measurements taken: none    Patient received individual therapy to increase strength, endurance and ROM with activities as follows:     Virgilio received therapeutic exercises to develop strength, endurance and ROM for 40 minutes including:   Seated extension x 5 min 3#  Quad set towel x 30  Quad set flat x 30  EOT flexion x 3 min  Seated extension x 5 min 3#  Standing TKEs x 3 min  6 in step ups 3x10  EOT flexion x 3 min  Shuttle DL 2c 3x10  Shuttle SL 1c 3x10  Bike x 5 min (level 2)  Heel slides x 3 min  Extension series x 5 min  6 in step downs 3x10  Shuttle SL 2c 3x10    Virgilio received the following manual therapy techniques: Joint mobilizations and Soft tissue Mobilization were applied to the: left knee for 5 minutes.   PROM: ext  Patellar mobs        Written Home Exercises Provided: updated as per therex list  Pt demo  good understanding of the education provided. Virgilio demonstrated good return demonstration of activities.     Education provided:  Virgilio verbalized good understanding of education provided.   No spiritual or educational barriers to learning identified.    Assessment     Pt continues to make gains with quad control, eccentric loading, and knee flexion despite mild fluid retention. Continue to progress as tolerated.     This is a 57 y.o. male referred to outpatient physical therapy and presents with a medical diagnosis of left knee menisectomy and demonstrates limitations as described in the problem list Pt prognosis is Good. Pt will continue to benefit from skilled outpatient physical therapy to address the deficits listed below in the problem list, provide pt/family education and to maximize pt's level of independence in the home and community environment.    Will the patient continue to benefit from skilled PT intervention? yes        Medical necessity is demonstrated by:   - Pain limits function of effected part for all activities  - Unable to participate in daily activities   - Requires skilled supervision to complete and progress HEP  - Fall risk - impaired balance   - Continued inability to participate in vocational pursuits    Short Term Goals (6 Weeks):  - Pt will increase ROM of left knee = right knee  - Pt will increase strength of bilateral hip abd = 5/5  - Decrease Pain to 0/10 with ADLs  - Pt to self correct posture independently.  - Pt independent with HEP with progressions.      Long Term Goals (16-18 Weeks):  - Pt able to SLS without valgus 3x10  - Pt with normal loading mechanics with lunge walks.  - Pt with Ybalance = 94% of uninvolved.  - Pt with normalized jogging mechanics with 0/10 pain.       Plan   Continue with established Plan of Care towards PT goals.      Therapist: EDDIE MIMS, PT

## 2018-03-23 ENCOUNTER — CLINICAL SUPPORT (OUTPATIENT)
Dept: REHABILITATION | Facility: HOSPITAL | Age: 58
End: 2018-03-23
Attending: ORTHOPAEDIC SURGERY
Payer: COMMERCIAL

## 2018-03-23 DIAGNOSIS — M25.562 LEFT ANTERIOR KNEE PAIN: Primary | ICD-10-CM

## 2018-03-23 PROCEDURE — 97110 THERAPEUTIC EXERCISES: CPT | Mod: PN | Performed by: PHYSICAL THERAPIST

## 2018-03-23 NOTE — PROGRESS NOTES
Physical Therapy Daily Note     Date: 03/23/2018  Name: Virgilio Maher Jr.  Clinic Number: [id number]  Diagnosis: PROCEDURES(S) PERFORMED:   1. Left  Arthroscopy, debridement/shaving of articular cartilage (chondroplasty) 92811  2.  Left  Arthroscopy, with meniscectomy (medial OR lateral) 76921, medial  3.  Left  Arthroscopy, knee, for removal of loose body or foreign body 61137  4.  Left  Amniox Arthrocentesis, 03512  Encounter Diagnosis   Name Primary?    Left anterior knee pain Yes     Physician: Alfredito Babcock MD    Evaluation Date: 2/28/18  Date of Surgery: 3/1/18  Visit #:  10  Start Time:  9:00  Stop Time:  10:00  Total Treatment Time: 60    Precautions: PHYSICAL THERAPY:  The patient should begin physical therapy on postoperative   day # 3 and will be advanced to outpatient therapy as soon as   Possible following discharge.  Weight bearing:as tolerated  left leg  Range of Motion:Full normal motion symmetric to opposite side    Subjective     Pt reports the knee is a little swollen but it is straight.     Pain: 1/10    Objective     Measurements taken: none    Patient received individual therapy to increase strength, endurance and ROM with activities as follows:     Virgilio received therapeutic exercises to develop strength, endurance and ROM for 40 minutes including:   Seated extension x 5 min 3#  Quad set towel x 30  Quad set flat x 30  EOT flexion x 3 min  Seated extension x 5 min 3#  Standing TKEs x 3 min  6 in step ups 3x10  EOT flexion x 3 min  Shuttle DL 2c 3x10  Shuttle SL 1c 3x10  Bike x 5 min (level 2)  Heel slides x 3 min  Extension series x 5 min  6 in step downs 3x10  Split squats 2x10    Virgilio received the following manual therapy techniques: Joint mobilizations and Soft tissue Mobilization were applied to the: left knee for 5 minutes.   PROM: ext  Patellar mobs        Written Home Exercises Provided: updated as per therex list  Pt demo  good understanding of the education provided. Virgilio demonstrated good return demonstration of activities.     Education provided:  Virgilio verbalized good understanding of education provided.   No spiritual or educational barriers to learning identified.    Assessment     Quad control and single leg stabilization continue to improve with rx. Eccentric loading continues to improve as well.    This is a 57 y.o. male referred to outpatient physical therapy and presents with a medical diagnosis of left knee menisectomy and demonstrates limitations as described in the problem list Pt prognosis is Good. Pt will continue to benefit from skilled outpatient physical therapy to address the deficits listed below in the problem list, provide pt/family education and to maximize pt's level of independence in the home and community environment.    Will the patient continue to benefit from skilled PT intervention? yes        Medical necessity is demonstrated by:   - Pain limits function of effected part for all activities  - Unable to participate in daily activities   - Requires skilled supervision to complete and progress HEP  - Fall risk - impaired balance   - Continued inability to participate in vocational pursuits    Short Term Goals (6 Weeks):  - Pt will increase ROM of left knee = right knee  - Pt will increase strength of bilateral hip abd = 5/5  - Decrease Pain to 0/10 with ADLs  - Pt to self correct posture independently.  - Pt independent with HEP with progressions.      Long Term Goals (16-18 Weeks):  - Pt able to SLS without valgus 3x10  - Pt with normal loading mechanics with lunge walks.  - Pt with Ybalance = 94% of uninvolved.  - Pt with normalized jogging mechanics with 0/10 pain.       Plan   Continue with established Plan of Care towards PT goals.      Therapist: EDDIE MIMS, PT

## 2018-03-26 ENCOUNTER — CLINICAL SUPPORT (OUTPATIENT)
Dept: REHABILITATION | Facility: HOSPITAL | Age: 58
End: 2018-03-26
Attending: ORTHOPAEDIC SURGERY
Payer: COMMERCIAL

## 2018-03-26 DIAGNOSIS — M25.562 LEFT ANTERIOR KNEE PAIN: Primary | ICD-10-CM

## 2018-03-26 PROCEDURE — 97110 THERAPEUTIC EXERCISES: CPT | Mod: PN | Performed by: PHYSICAL THERAPIST

## 2018-03-26 NOTE — PROGRESS NOTES
"                                                  Physical Therapy Daily Note     Date: 03/26/2018  Name: Virgilio Maher Jr.  Clinic Number: [id number]  Diagnosis: PROCEDURES(S) PERFORMED:   1. Left  Arthroscopy, debridement/shaving of articular cartilage (chondroplasty) 74810  2.  Left  Arthroscopy, with meniscectomy (medial OR lateral) 46458, medial  3.  Left  Arthroscopy, knee, for removal of loose body or foreign body 64788  4.  Left  Amniox Arthrocentesis, 23626  Encounter Diagnosis   Name Primary?    Left anterior knee pain Yes     Physician: Alfredito Babcock MD    Evaluation Date: 2/28/18  Date of Surgery: 3/1/18  Visit #:  11  Start Time:  9:30  Stop Time:  10:30  Total Treatment Time: 60    Precautions: PHYSICAL THERAPY:  The patient should begin physical therapy on postoperative   day # 3 and will be advanced to outpatient therapy as soon as   Possible following discharge.  Weight bearing:as tolerated  left leg  Range of Motion:Full normal motion symmetric to opposite side    Subjective     Pt reports the knee is ok. I went to [place] and spent 9 hours in the car. It is holding up well.     Pain: 1/10    Objective     Measurements taken: none    Patient received individual therapy to increase strength, endurance and ROM with activities as follows:     Virgilio received therapeutic exercises to develop strength, endurance and ROM for 40 minutes including:   Seated extension x 5 min 3#  Quad set towel x 30  Quad set flat x 30  EOT flexion x 3 min  Split squats x 20  Seated EOT lunge x 30  Bike x 5 min  6 in step downs 3x10  Wall sits 3x30"  PIilates SLS x 30  Shuttle SL 2.5c 3x10    Virgilio received the following manual therapy techniques: Joint mobilizations and Soft tissue Mobilization were applied to the: left knee for 5 minutes.   PROM: ext  Patellar mobs        Written Home Exercises Provided: updated as per therex list  Pt demo good understanding of the education provided. Virgilio demonstrated good " return demonstration of activities.     Education provided:  Virgilio verbalized good understanding of education provided.   No spiritual or educational barriers to learning identified.    Assessment     Single leg stability, balance, and NM control continue to improve with rx. Continue to progress single leg stabilization work. Excellent functional mobility considering long car ride to and from [place].     This is a 57 y.o. male referred to outpatient physical therapy and presents with a medical diagnosis of left knee menisectomy and demonstrates limitations as described in the problem list Pt prognosis is Good. Pt will continue to benefit from skilled outpatient physical therapy to address the deficits listed below in the problem list, provide pt/family education and to maximize pt's level of independence in the home and community environment.    Will the patient continue to benefit from skilled PT intervention? yes        Medical necessity is demonstrated by:   - Pain limits function of effected part for all activities  - Unable to participate in daily activities   - Requires skilled supervision to complete and progress HEP  - Fall risk - impaired balance   - Continued inability to participate in vocational pursuits    Short Term Goals (6 Weeks):  - Pt will increase ROM of left knee = right knee  - Pt will increase strength of bilateral hip abd = 5/5  - Decrease Pain to 0/10 with ADLs  - Pt to self correct posture independently.  - Pt independent with HEP with progressions.      Long Term Goals (16-18 Weeks):  - Pt able to SLS without valgus 3x10  - Pt with normal loading mechanics with lunge walks.  - Pt with Ybalance = 94% of uninvolved.  - Pt with normalized jogging mechanics with 0/10 pain.       Plan   Continue with established Plan of Care towards PT goals.      Therapist: EDDIE MIMS, PT

## 2018-03-29 ENCOUNTER — CLINICAL SUPPORT (OUTPATIENT)
Dept: REHABILITATION | Facility: HOSPITAL | Age: 58
End: 2018-03-29
Attending: ORTHOPAEDIC SURGERY
Payer: COMMERCIAL

## 2018-03-29 DIAGNOSIS — M25.562 LEFT ANTERIOR KNEE PAIN: Primary | ICD-10-CM

## 2018-03-29 PROCEDURE — 97110 THERAPEUTIC EXERCISES: CPT | Mod: PN | Performed by: PHYSICAL THERAPIST

## 2018-03-29 NOTE — PROGRESS NOTES
"                                                  Physical Therapy Daily Note     Date: 03/29/2018  Name: Virgilio Maher Jr.  Clinic Number: [id number]  Diagnosis: PROCEDURES(S) PERFORMED:   1. Left  Arthroscopy, debridement/shaving of articular cartilage (chondroplasty) 90364  2.  Left  Arthroscopy, with meniscectomy (medial OR lateral) 12035, medial  3.  Left  Arthroscopy, knee, for removal of loose body or foreign body 39247  4.  Left  Amniox Arthrocentesis, 29487  Encounter Diagnosis   Name Primary?    Left anterior knee pain Yes     Physician: Alfredito Babcock MD    Evaluation Date: 2/28/18  Date of Surgery: 3/1/18  Visit #:  12  Start Time:  9:00  Stop Time:  10:00  Total Treatment Time: 60    Precautions: PHYSICAL THERAPY:  The patient should begin physical therapy on postoperative   day # 3 and will be advanced to outpatient therapy as soon as   Possible following discharge.  Weight bearing:as tolerated  left leg  Range of Motion:Full normal motion symmetric to opposite side    Subjective     Pt reports the knee is sore but ok.     Pain: 1/10    Objective     Measurements taken: none    Patient received individual therapy to increase strength, endurance and ROM with activities as follows:     Virgilio received therapeutic exercises to develop strength, endurance and ROM for 40 minutes including:   Seated extension x 5 min 3#  Quad set towel x 30  Quad set flat x 30  EOT flexion x 3 min  Split squats x 20  Seated EOT lunge x 30  Bike x 5 min  6 in step downs 3x10  Wall sits 3x30"  PIilates SLS x 30  Shuttle SL 2.5c 3x10  Shuttle 2-1 2c 3x10    Virgilio received the following manual therapy techniques: Joint mobilizations and Soft tissue Mobilization were applied to the: left knee for 5 minutes.   PROM: ext  Patellar mobs  Fat pad mobs        Written Home Exercises Provided: updated as per therex list  Pt demo good understanding of the education provided. Virgilio demonstrated good return demonstration of " activities.     Education provided:  Virgilio verbalized good understanding of education provided.   No spiritual or educational barriers to learning identified.    Assessment     Quad control and overall single leg stability continue to improve. Continue to work on end range motion recovery and overall quad strength.     This is a 57 y.o. male referred to outpatient physical therapy and presents with a medical diagnosis of left knee menisectomy and demonstrates limitations as described in the problem list Pt prognosis is Good. Pt will continue to benefit from skilled outpatient physical therapy to address the deficits listed below in the problem list, provide pt/family education and to maximize pt's level of independence in the home and community environment.    Will the patient continue to benefit from skilled PT intervention? yes        Medical necessity is demonstrated by:   - Pain limits function of effected part for all activities  - Unable to participate in daily activities   - Requires skilled supervision to complete and progress HEP  - Fall risk - impaired balance   - Continued inability to participate in vocational pursuits    Short Term Goals (6 Weeks):  - Pt will increase ROM of left knee = right knee  - Pt will increase strength of bilateral hip abd = 5/5  - Decrease Pain to 0/10 with ADLs  - Pt to self correct posture independently.  - Pt independent with HEP with progressions.      Long Term Goals (16-18 Weeks):  - Pt able to SLS without valgus 3x10  - Pt with normal loading mechanics with lunge walks.  - Pt with Ybalance = 94% of uninvolved.  - Pt with normalized jogging mechanics with 0/10 pain.       Plan   Continue with established Plan of Care towards PT goals.      Therapist: EDDIE MIMS, PT

## 2018-04-03 ENCOUNTER — CLINICAL SUPPORT (OUTPATIENT)
Dept: REHABILITATION | Facility: HOSPITAL | Age: 58
End: 2018-04-03
Attending: ORTHOPAEDIC SURGERY
Payer: COMMERCIAL

## 2018-04-03 DIAGNOSIS — M25.562 LEFT ANTERIOR KNEE PAIN: Primary | ICD-10-CM

## 2018-04-03 PROCEDURE — 97110 THERAPEUTIC EXERCISES: CPT | Mod: PN | Performed by: PHYSICAL THERAPIST

## 2018-04-03 NOTE — PROGRESS NOTES
"                                                  Physical Therapy Daily Note     Date: 04/03/2018  Name: Virgilio Maher Jr.  Clinic Number: [id number]  Diagnosis: PROCEDURES(S) PERFORMED:   1. Left  Arthroscopy, debridement/shaving of articular cartilage (chondroplasty) 23079  2.  Left  Arthroscopy, with meniscectomy (medial OR lateral) 26492, medial  3.  Left  Arthroscopy, knee, for removal of loose body or foreign body 94903  4.  Left  Amniox Arthrocentesis, 61482  Encounter Diagnosis   Name Primary?    Left anterior knee pain Yes     Physician: Alfredito Babcock MD    Evaluation Date: 2/28/18  Date of Surgery: 3/1/18  Visit #:  13  Start Time:  9:00  Stop Time:  10:00  Total Treatment Time: 60    Precautions: PHYSICAL THERAPY:  The patient should begin physical therapy on postoperative   day # 3 and will be advanced to outpatient therapy as soon as   Possible following discharge.  Weight bearing:as tolerated  left leg  Range of Motion:Full normal motion symmetric to opposite side    Subjective     Pt reports the knee is sore. I feel like my quad is not firing as well.     Pain: 1/10    Objective     Measurements taken: none    Patient received individual therapy to increase strength, endurance and ROM with activities as follows:     Virgilio received therapeutic exercises to develop strength, endurance and ROM for 40 minutes including:   Seated extension x 5 min 3#  Quad set towel x 30  Quad set flat x 30  EOT flexion x 3 min  Split squats x 20  Seated EOT lunge x 30  Bike x 5 min  6 in step downs 3x10  Wall sits 3x30"  PIilates SLS x 30  Shuttle SL 2.5c 3x10  Shuttle 2-1 2c 3x10  Manual leg extension to burn x 3     Virgilio received the following manual therapy techniques: Joint mobilizations and Soft tissue Mobilization were applied to the: left knee for 5 minutes.   PROM: ext  Patellar mobs  Fat pad mobs        Written Home Exercises Provided: updated as per therex list  Pt demo good understanding of the " education provided. Virgilio demonstrated good return demonstration of activities.     Education provided:  Virgilio verbalized good understanding of education provided.   No spiritual or educational barriers to learning identified.    Assessment     Pt continues to fire hamstring with quad. Continue to strengthen quad with open and closed chain loading with emphasis on mitigation co-contraction.     This is a 57 y.o. male referred to outpatient physical therapy and presents with a medical diagnosis of left knee menisectomy and demonstrates limitations as described in the problem list Pt prognosis is Good. Pt will continue to benefit from skilled outpatient physical therapy to address the deficits listed below in the problem list, provide pt/family education and to maximize pt's level of independence in the home and community environment.    Will the patient continue to benefit from skilled PT intervention? yes        Medical necessity is demonstrated by:   - Pain limits function of effected part for all activities  - Unable to participate in daily activities   - Requires skilled supervision to complete and progress HEP  - Fall risk - impaired balance   - Continued inability to participate in vocational pursuits    Short Term Goals (6 Weeks):  - Pt will increase ROM of left knee = right knee  - Pt will increase strength of bilateral hip abd = 5/5  - Decrease Pain to 0/10 with ADLs  - Pt to self correct posture independently.  - Pt independent with HEP with progressions.      Long Term Goals (16-18 Weeks):  - Pt able to SLS without valgus 3x10  - Pt with normal loading mechanics with lunge walks.  - Pt with Ybalance = 94% of uninvolved.  - Pt with normalized jogging mechanics with 0/10 pain.       Plan   Continue with established Plan of Care towards PT goals.      Therapist: EDDIE MIMS, PT

## 2018-04-09 ENCOUNTER — CLINICAL SUPPORT (OUTPATIENT)
Dept: REHABILITATION | Facility: HOSPITAL | Age: 58
End: 2018-04-09
Attending: ORTHOPAEDIC SURGERY
Payer: COMMERCIAL

## 2018-04-09 DIAGNOSIS — M25.562 LEFT ANTERIOR KNEE PAIN: Primary | ICD-10-CM

## 2018-04-09 PROCEDURE — 97110 THERAPEUTIC EXERCISES: CPT | Mod: PN | Performed by: PHYSICAL THERAPIST

## 2018-04-09 NOTE — PROGRESS NOTES
"                                                  Physical Therapy Daily Note     Date: 04/09/2018  Name: Virgilio Maher Jr.  Clinic Number: [id number]  Diagnosis: PROCEDURES(S) PERFORMED:   1. Left  Arthroscopy, debridement/shaving of articular cartilage (chondroplasty) 37441  2.  Left  Arthroscopy, with meniscectomy (medial OR lateral) 15850, medial  3.  Left  Arthroscopy, knee, for removal of loose body or foreign body 01700  4.  Left  Amniox Arthrocentesis, 35864  Encounter Diagnosis   Name Primary?    Left anterior knee pain Yes     Physician: Alfredito Babcock MD    Evaluation Date: 2/28/18  Date of Surgery: 3/1/18  Visit #:  14  Start Time:  2:30  Stop Time:  3:30  Total Treatment Time: 60    Precautions: PHYSICAL THERAPY:  The patient should begin physical therapy on postoperative   day # 3 and will be advanced to outpatient therapy as soon as   Possible following discharge.  Weight bearing:as tolerated  left leg  Range of Motion:Full normal motion symmetric to opposite side    Subjective     Pt reports the knee has been sore from the trip to [place]. I was supposed to be scheduled and called to schedule and that didn't happen.     Pain: 1/10    Objective     Measurements taken: none    Patient received individual therapy to increase strength, endurance and ROM with activities as follows:     Virgilio received therapeutic exercises to develop strength, endurance and ROM for 40 minutes including:   Seated extension x 5 min 3#  Quad set towel x 30  Quad set flat x 30  EOT flexion x 3 min  Split squats x 20  Seated EOT lunge x 30  Bike x 5 min  6 in step downs 3x10  Wall sits 3x30"  PIilates SLS x 30  Shuttle SL 2.5c 3x10  Shuttle 2-1 2c 3x10  Manual leg extension to burn x 3   SLS 3x10    Virgilio received the following manual therapy techniques: Joint mobilizations and Soft tissue Mobilization were applied to the: left knee for 5 minutes.   PROM: ext  Patellar mobs  Fat pad mobs        Written Home Exercises " Provided: updated as per therex list  Pt demo good understanding of the education provided. Virgilio demonstrated good return demonstration of activities.     Education provided:  Virgilio verbalized good understanding of education provided.   No spiritual or educational barriers to learning identified.    Assessment     Improving single leg stability and eccentric quad control. Continue to progress functional hip and single leg loading without increasing PFP.    This is a 57 y.o. male referred to outpatient physical therapy and presents with a medical diagnosis of left knee menisectomy and demonstrates limitations as described in the problem list Pt prognosis is Good. Pt will continue to benefit from skilled outpatient physical therapy to address the deficits listed below in the problem list, provide pt/family education and to maximize pt's level of independence in the home and community environment.    Will the patient continue to benefit from skilled PT intervention? yes        Medical necessity is demonstrated by:   - Pain limits function of effected part for all activities  - Unable to participate in daily activities   - Requires skilled supervision to complete and progress HEP  - Fall risk - impaired balance   - Continued inability to participate in vocational pursuits    Short Term Goals (6 Weeks):  - Pt will increase ROM of left knee = right knee  - Pt will increase strength of bilateral hip abd = 5/5  - Decrease Pain to 0/10 with ADLs  - Pt to self correct posture independently.  - Pt independent with HEP with progressions.      Long Term Goals (16-18 Weeks):  - Pt able to SLS without valgus 3x10  - Pt with normal loading mechanics with lunge walks.  - Pt with Ybalance = 94% of uninvolved.  - Pt with normalized jogging mechanics with 0/10 pain.       Plan   Continue with established Plan of Care towards PT goals.      Therapist: EDDIE MIMS, PT

## 2018-04-12 ENCOUNTER — CLINICAL SUPPORT (OUTPATIENT)
Dept: REHABILITATION | Facility: HOSPITAL | Age: 58
End: 2018-04-12
Attending: ORTHOPAEDIC SURGERY
Payer: COMMERCIAL

## 2018-04-12 DIAGNOSIS — M25.562 LEFT ANTERIOR KNEE PAIN: Primary | ICD-10-CM

## 2018-04-12 PROCEDURE — 97110 THERAPEUTIC EXERCISES: CPT | Mod: PN | Performed by: PHYSICAL THERAPIST

## 2018-04-12 NOTE — PROGRESS NOTES
"                                                  Physical Therapy Daily Note     Date: 04/12/2018  Name: Virgilio Maher Jr.  Clinic Number: [id number]  Diagnosis: PROCEDURES(S) PERFORMED:   1. Left  Arthroscopy, debridement/shaving of articular cartilage (chondroplasty) 04727  2.  Left  Arthroscopy, with meniscectomy (medial OR lateral) 29848, medial  3.  Left  Arthroscopy, knee, for removal of loose body or foreign body 26848  4.  Left  Amniox Arthrocentesis, 18482  Encounter Diagnosis   Name Primary?    Left anterior knee pain Yes     Physician: Alfredito Babcock MD    Evaluation Date: 2/28/18  Date of Surgery: 3/1/18  Visit #:  15  Start Time:  9:00  Stop Time:  10:00  Total Treatment Time: 60    Precautions: PHYSICAL THERAPY:  The patient should begin physical therapy on postoperative   day # 3 and will be advanced to outpatient therapy as soon as   Possible following discharge.  Weight bearing:as tolerated  left leg  Range of Motion:Full normal motion symmetric to opposite side    Subjective     Pt reports the knee is feeling better.   Pain: 1/10    Objective     Measurements taken: none    Patient received individual therapy to increase strength, endurance and ROM with activities as follows:     Virgilio received therapeutic exercises to develop strength, endurance and ROM for 40 minutes including:   Seated extension x 5 min 3#  Quad set towel x 30  Quad set flat x 30  EOT flexion x 3 min  Split squats x 20  Seated EOT lunge x 30  Bike x 5 min  6 in step downs 3x10  Wall sits 3x30"  PIilates SLS x 30  Shuttle SL 2.5c 3x10  Shuttle 2-1 2c 3x10  Manual leg extension to burn x 3   SLS 3x10    Virgilio received the following manual therapy techniques: Joint mobilizations and Soft tissue Mobilization were applied to the: left knee for 5 minutes.   PROM: ext  Patellar mobs  Fat pad mobs        Written Home Exercises Provided: updated as per therex list  Pt demo good understanding of the education provided. Virgilio " demonstrated good return demonstration of activities.     Education provided:  Virgilio verbalized good understanding of education provided.   No spiritual or educational barriers to learning identified.    Assessment     Single leg control and quad firing continue to improve with rx. Pt went fishing on Wedn and has no increased symptoms. Continue to progress as tolerated.     This is a 57 y.o. male referred to outpatient physical therapy and presents with a medical diagnosis of left knee menisectomy and demonstrates limitations as described in the problem list Pt prognosis is Good. Pt will continue to benefit from skilled outpatient physical therapy to address the deficits listed below in the problem list, provide pt/family education and to maximize pt's level of independence in the home and community environment.    Will the patient continue to benefit from skilled PT intervention? yes        Medical necessity is demonstrated by:   - Pain limits function of effected part for all activities  - Unable to participate in daily activities   - Requires skilled supervision to complete and progress HEP  - Fall risk - impaired balance   - Continued inability to participate in vocational pursuits    Short Term Goals (6 Weeks):  - Pt will increase ROM of left knee = right knee  - Pt will increase strength of bilateral hip abd = 5/5  - Decrease Pain to 0/10 with ADLs  - Pt to self correct posture independently.  - Pt independent with HEP with progressions.      Long Term Goals (16-18 Weeks):  - Pt able to SLS without valgus 3x10  - Pt with normal loading mechanics with lunge walks.  - Pt with Ybalance = 94% of uninvolved.  - Pt with normalized jogging mechanics with 0/10 pain.       Plan   Continue with established Plan of Care towards PT goals.      Therapist: EDDIE MIMS, PT

## 2018-04-16 ENCOUNTER — CLINICAL SUPPORT (OUTPATIENT)
Dept: REHABILITATION | Facility: HOSPITAL | Age: 58
End: 2018-04-16
Attending: ORTHOPAEDIC SURGERY
Payer: COMMERCIAL

## 2018-04-16 DIAGNOSIS — M25.562 LEFT ANTERIOR KNEE PAIN: Primary | ICD-10-CM

## 2018-04-16 PROCEDURE — 97110 THERAPEUTIC EXERCISES: CPT | Mod: PN | Performed by: PHYSICAL THERAPIST

## 2018-04-16 NOTE — PROGRESS NOTES
Physical Therapy Daily Note     Date: 04/16/2018  Name: Virgilio Maher Jr.  Clinic Number: [id number]  Diagnosis: PROCEDURES(S) PERFORMED:   1. Left  Arthroscopy, debridement/shaving of articular cartilage (chondroplasty) 53994  2.  Left  Arthroscopy, with meniscectomy (medial OR lateral) 82457, medial  3.  Left  Arthroscopy, knee, for removal of loose body or foreign body 95970  4.  Left  Amniox Arthrocentesis, 80806  Encounter Diagnosis   Name Primary?    Left anterior knee pain Yes     Physician: Alfredito Babcock MD    Evaluation Date: 2/28/18  Date of Surgery: 3/1/18  Visit #:  16  Start Time:  11:30  Stop Time:  12:30  Total Treatment Time: 60    Precautions: PHYSICAL THERAPY:  The patient should begin physical therapy on postoperative   day # 3 and will be advanced to outpatient therapy as soon as   Possible following discharge.  Weight bearing:as tolerated  left leg  Range of Motion:Full normal motion symmetric to opposite side    Subjective     Pt reports the knee is still a little stiff but doing much better.   Pain: 1/10    Objective     Measurements taken: none    Patient received individual therapy to increase strength, endurance and ROM with activities as follows:     Virgilio received therapeutic exercises to develop strength, endurance and ROM for 40 minutes including:   Seated extension x 5 min 3#  Quad set towel x 30  Quad set flat x 30  Bike x 8 min  6 in step downs 3x10  PIilates SLS x 30  Shuttle SL 2.5c 3x10  Shuttle 2-1 2c 3x10  Pilates SLS 2x1 min  Side steps x 2 laps    Virgilio received the following manual therapy techniques: Joint mobilizations and Soft tissue Mobilization were applied to the: left knee for 5 minutes.   PROM: ext  Patellar mobs  Fat pad mobs        Written Home Exercises Provided: updated as per therex list  Pt demo good understanding of the education provided. Virgilio demonstrated good return demonstration of activities.      Education provided:  Virgilio verbalized good understanding of education provided.   No spiritual or educational barriers to learning identified.    Assessment     Quad control and single leg stability continue to improve as does end range motion. Continue to progress functional quad and hip strength with rx. for asymptomatic return to ADLs.    This is a 57 y.o. male referred to outpatient physical therapy and presents with a medical diagnosis of left knee menisectomy and demonstrates limitations as described in the problem list Pt prognosis is Good. Pt will continue to benefit from skilled outpatient physical therapy to address the deficits listed below in the problem list, provide pt/family education and to maximize pt's level of independence in the home and community environment.    Will the patient continue to benefit from skilled PT intervention? yes        Medical necessity is demonstrated by:   - Pain limits function of effected part for all activities  - Unable to participate in daily activities   - Requires skilled supervision to complete and progress HEP  - Fall risk - impaired balance   - Continued inability to participate in vocational pursuits    Short Term Goals (6 Weeks):  - Pt will increase ROM of left knee = right knee  - Pt will increase strength of bilateral hip abd = 5/5  - Decrease Pain to 0/10 with ADLs  - Pt to self correct posture independently.  - Pt independent with HEP with progressions.      Long Term Goals (16-18 Weeks):  - Pt able to SLS without valgus 3x10  - Pt with normal loading mechanics with lunge walks.  - Pt with Ybalance = 94% of uninvolved.  - Pt with normalized jogging mechanics with 0/10 pain.       Plan   Continue with established Plan of Care towards PT goals.      Therapist: EDDIE MIMS, PT

## 2018-04-18 ENCOUNTER — OFFICE VISIT (OUTPATIENT)
Dept: SPORTS MEDICINE | Facility: CLINIC | Age: 58
End: 2018-04-18
Payer: COMMERCIAL

## 2018-04-18 VITALS
HEIGHT: 73 IN | SYSTOLIC BLOOD PRESSURE: 128 MMHG | BODY MASS INDEX: 27.83 KG/M2 | WEIGHT: 210 LBS | HEART RATE: 76 BPM | DIASTOLIC BLOOD PRESSURE: 72 MMHG

## 2018-04-18 DIAGNOSIS — M25.562 ACUTE PAIN OF LEFT KNEE: ICD-10-CM

## 2018-04-18 DIAGNOSIS — M94.262 CHONDROMALACIA OF LEFT KNEE: ICD-10-CM

## 2018-04-18 DIAGNOSIS — M22.42 CHONDROMALACIA OF PATELLA, LEFT: ICD-10-CM

## 2018-04-18 DIAGNOSIS — M23.92 INTERNAL DERANGEMENT OF KNEE JOINT, LEFT: Primary | ICD-10-CM

## 2018-04-18 PROCEDURE — 99024 POSTOP FOLLOW-UP VISIT: CPT | Mod: S$GLB,,, | Performed by: ORTHOPAEDIC SURGERY

## 2018-04-18 PROCEDURE — 99999 PR PBB SHADOW E&M-EST. PATIENT-LVL III: CPT | Mod: PBBFAC,,, | Performed by: ORTHOPAEDIC SURGERY

## 2018-04-18 NOTE — PROGRESS NOTES
Subjective:          Chief Complaint: Virgilio Maher Jr. is a 57 y.o. male who had concerns including Post-op Evaluation of the Left Knee.    Patient is here for a follow up for his left knee. He is doing PT with [facility]. He finished the [facility] April 1st.     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 Left  Arthroscopy, debridement/shaving of articular cartilage (chondroplasty) 26512  2.  Left  Arthroscopy, with meniscectomy (medial OR lateral) 03158, medial  3.  Left  Arthroscopy, knee, for removal of loose body or foreign body 83730  4.  Left  Amniox Arthrocentesis, 96823            Review of Systems   Constitution: Negative for fever and night sweats.   HENT: Negative for hearing loss.    Eyes: Negative for blurred vision and visual disturbance.   Cardiovascular: Negative for chest pain and leg swelling.   Respiratory: Negative for shortness of breath.    Endocrine: Negative for polyuria.   Hematologic/Lymphatic: Negative for bleeding problem.   Skin: Negative for rash.   Musculoskeletal: Negative for back pain, joint pain, joint swelling, muscle cramps and muscle weakness.   Gastrointestinal: Negative for melena.   Genitourinary: Negative for hematuria.   Neurological: Negative for loss of balance, numbness and paresthesias.   Psychiatric/Behavioral: Negative for altered mental status.       Pain Related Questions  Over the past 3 days, what was your  average pain during activity? (I.e. running, jogging, walking, climbing stairs, getting dressed, ect.): 3  Over the past 3 days, what was your highest pain level?: 3  Over the past 3 days, what was your lowest pain level? : 0    Other  How many nights a week are you awakened by your affected body part?: 0  Was the patient's HEIGHT measured or patient reported?: Patient Reported  Was the patient's WEIGHT measured or patient reported?: Measured      Objective:        General: Virgilio is well-developed, well-nourished, appears stated age, in no acute distress, alert and oriented to time, place and person.     General    Vitals reviewed.  Constitutional: He is oriented to person, place, and time. He appears well-developed and well-nourished. No distress.   HENT:   Mouth/Throat: No oropharyngeal exudate.   Eyes: Right eye exhibits no discharge. Left eye exhibits no discharge.   Neck: Normal range of motion.   Pulmonary/Chest: Effort normal and breath sounds normal. No respiratory distress.   Neurological: He is alert and oriented to person, place, and time. He has normal reflexes. No cranial nerve deficit. Coordination normal.   Psychiatric: He has a normal mood and affect. His behavior is normal. Judgment and thought content normal.     General Musculoskeletal Exam   Gait: normal       Right Knee Exam     Inspection   Erythema: absent  Scars: absent  Swelling: absent  Effusion: effusion  Deformity: deformity  Bruising: absent    Tenderness   The patient is experiencing no tenderness.         Range of Motion   Extension: 0   Right knee flexion: 145.     Tests   Meniscus   Sima:  Medial - negative Lateral - negative  Ligament Examination Lachman: normal (-1 to 2mm) PCL-Posterior Drawer: normal (0 to 2mm)     MCL - Valgus: normal (0 to 2mm)  LCL - Varus: normalPivot Shift: normal (Equal)Reverse Pivot Shift: normal (Equal)Dial Test at 30 degrees: normal (< 5 degrees)Dial Test at 90 degrees: normal (< 5  degrees)  Posterior Sag Test: negative  Posterolateral Corner: unstable (>15 degrees difference)  Patella   Patellar Apprehension: negative  Passive Patellar Tilt: neutral  Patellar Tracking: normal  Patellar Glide (quadrants): Lateral - 1   Medial - 2  Q-Angle at 90 degrees: normal  Patellar Grind: negative  J-Sign: none    Other   Meniscal Cyst: absent  Popliteal (Baker's) Cyst: absent  Sensation: normal    Left Knee Exam     Inspection   Erythema: absent  Scars: present  Swelling: absent  Effusion: absent  Deformity: deformity  Bruising: absent    Tenderness   The patient tender to palpation of the medial joint line and patellar tendon.    Range of Motion   Extension: abnormal Left knee extension grade: 3*   Flexion: abnormal Left knee flexion: 135.     Tests   Meniscus   Sima:  Medial - negative Lateral - negative  Stability Lachman: normal (-1 to 2mm) PCL-Posterior Drawer: normal (0 to 2mm)  MCL - Valgus: normal (0 to 2mm)  LCL - Varus: normal (0 to 2mm)Pivot Shift: normal (Equal)Reverse Pivot Shift: normal (Equal)Dial Test at 30 degrees: normal (< 5 degrees)Dial Test at 90 degrees: normal (< 5 degrees)  Posterior Sag Test: negative  Posterolateral Corner: unstable (>15 degrees difference)  Patella   Patellar Apprehension: negative  Passive Patellar Tilt: neutral  Patellar Tracking: normal  Patellar Glide (Quadrants): Lateral - 1 Medial - 2  Q-Angle at 90 degrees: normal  Patellar Grind: negative  J-Sign: J sign absent    Other   Meniscal Cyst: absent  Popliteal (Baker's) Cyst: absent  Sensation: normal    Right Hip Exam     Tests   Samira: negative  Left Hip Exam     Tests   Samira: negative          Reflexes     Left Side  Quadriceps:  2+  Achilles:  2+    Right Side   Quadriceps:  2+  Achilles:  2+    Vascular Exam     Right Pulses  Dorsalis Pedis:      2+  Posterior Tibial:      2+        Left Pulses  Dorsalis Pedis:      2+  Posterior Tibial:      2+                    Assessment:       Encounter Diagnoses    Name Primary?    Internal derangement of knee joint, left Yes    Acute pain of left knee     Chondromalacia of left knee     Chondromalacia of patella, left           Plan:       1. IKDC, SF-12 and KOOS was not filled out today in clinic.     RTC in 6 weeks with Dr. Barbara Leija Patient will fill out IKDC, SF-12 and KOOS and bilateral knee series on return.    2. Continue PT with [facility]   Bike, elliptical    3. Oneil fit with the OA lite brace today    4. Continue Celebrex 200mg BID PRN                       [facility] patient questionnaires have been collected today.

## 2018-04-19 ENCOUNTER — CLINICAL SUPPORT (OUTPATIENT)
Dept: REHABILITATION | Facility: HOSPITAL | Age: 58
End: 2018-04-19
Attending: ORTHOPAEDIC SURGERY
Payer: COMMERCIAL

## 2018-04-19 DIAGNOSIS — M25.562 LEFT ANTERIOR KNEE PAIN: Primary | ICD-10-CM

## 2018-04-19 PROCEDURE — 97110 THERAPEUTIC EXERCISES: CPT | Mod: PN

## 2018-04-19 NOTE — PROGRESS NOTES
Physical Therapy Daily Note     Date: 04/19/2018  Name: Virgilio Maher Jr.  Clinic Number: [id number]  Diagnosis: PROCEDURES(S) PERFORMED:   1. Left  Arthroscopy, debridement/shaving of articular cartilage (chondroplasty) 55346  2.  Left  Arthroscopy, with meniscectomy (medial OR lateral) 83712, medial  3.  Left  Arthroscopy, knee, for removal of loose body or foreign body 03843  4.  Left  Amniox Arthrocentesis, 52429  Encounter Diagnosis   Name Primary?    Left anterior knee pain Yes     Physician: Alfredito Babcock MD    Evaluation Date: 2/28/18  Date of Surgery: 3/1/18  Visit #:  17  Start Time:  1100 am  Stop Time:  12:00 PM    Total Treatment Time: 60      Precautions: PHYSICAL THERAPY:  The patient should begin physical therapy on postoperative   day # 3 and will be advanced to outpatient therapy as soon as   Possible following discharge.  Weight bearing:as tolerated  left leg  Range of Motion:Full normal motion symmetric to opposite side    Subjective     Pt reports  Knee is feeling better.    Pain: 1/10    Objective     Measurements taken: none    Patient received individual therapy to increase strength, endurance and ROM with activities as follows:     Virgilio received therapeutic exercises to develop strength, endurance and ROM for 40 minutes including:   Seated extension x 5 min 3#  Quad set towel x 30  Quad set flat x 30  Bike x 8 min  6 in step downs 3x10  PIilates SLS x 30  Shuttle SL 2.5c 3x10  Shuttle 2-1 2c 3x10  Pilates SLS 2x1 min  Side steps x 2 laps  SL squats 3 x 10   Single RDLs x 10     Virgilio received the following manual therapy techniques: Joint mobilizations and Soft tissue Mobilization were applied to the: left knee for 5 minutes.   PROM: ext  Patellar mobs  Fat pad mobs      Written Home Exercises Provided: updated as per therex list  Pt demo good understanding of the education provided. Virgilio demonstrated good return demonstration  of activities.     Education provided:  Virgilio verbalized good understanding of education provided.   No spiritual or educational barriers to learning identified.    Assessment     Continued improvement with terminal knee ext and quad control.    Continue to progress functional quad and hip strength with rx. for asymptomatic return to ADLs.    This is a 57 y.o. male referred to outpatient physical therapy and presents with a medical diagnosis of left knee menisectomy and demonstrates limitations as described in the problem list Pt prognosis is Good. Pt will continue to benefit from skilled outpatient physical therapy to address the deficits listed below in the problem list, provide pt/family education and to maximize pt's level of independence in the home and community environment.    Will the patient continue to benefit from skilled PT intervention? yes        Medical necessity is demonstrated by:   - Pain limits function of effected part for all activities  - Unable to participate in daily activities   - Requires skilled supervision to complete and progress HEP  - Fall risk - impaired balance   - Continued inability to participate in vocational pursuits    Short Term Goals (6 Weeks):  - Pt will increase ROM of left knee = right knee  - Pt will increase strength of bilateral hip abd = 5/5  - Decrease Pain to 0/10 with ADLs  - Pt to self correct posture independently.  - Pt independent with HEP with progressions.      Long Term Goals (16-18 Weeks):  - Pt able to SLS without valgus 3x10  - Pt with normal loading mechanics with lunge walks.  - Pt with Ybalance = 94% of uninvolved.  - Pt with normalized jogging mechanics with 0/10 pain.       Plan   Continue with established Plan of Care towards PT goals.      Therapist: Vamshi Quintero, PT

## 2018-04-23 ENCOUNTER — CLINICAL SUPPORT (OUTPATIENT)
Dept: REHABILITATION | Facility: HOSPITAL | Age: 58
End: 2018-04-23
Attending: ORTHOPAEDIC SURGERY
Payer: COMMERCIAL

## 2018-04-23 DIAGNOSIS — M25.562 LEFT ANTERIOR KNEE PAIN: Primary | ICD-10-CM

## 2018-04-23 PROCEDURE — 97110 THERAPEUTIC EXERCISES: CPT | Mod: PN | Performed by: PHYSICAL THERAPIST

## 2018-04-23 NOTE — PROGRESS NOTES
Physical Therapy Daily Note     Date: 04/23/2018  Name: Virgilio Maher Jr.  Clinic Number: [id number]  Diagnosis: PROCEDURES(S) PERFORMED:   1. Left  Arthroscopy, debridement/shaving of articular cartilage (chondroplasty) 37817  2.  Left  Arthroscopy, with meniscectomy (medial OR lateral) 04804, medial  3.  Left  Arthroscopy, knee, for removal of loose body or foreign body 27710  4.  Left  Amniox Arthrocentesis, 95710  Encounter Diagnosis   Name Primary?    Left anterior knee pain Yes     Physician: Alfredito Babcock MD    Evaluation Date: 2/28/18  Date of Surgery: 3/1/18  Visit #:  18  Start Time:  9:30   Stop Time:  10:30    Total Treatment Time: 60      Precautions: PHYSICAL THERAPY:  The patient should begin physical therapy on postoperative   day # 3 and will be advanced to outpatient therapy as soon as   Possible following discharge.  Weight bearing:as tolerated  left leg  Range of Motion:Full normal motion symmetric to opposite side    Subjective     Pt reports the knee is feeling better. It still gets stiff after sitting.    Pain: 1/10    Objective     Measurements taken: none    Patient received individual therapy to increase strength, endurance and ROM with activities as follows:     Virgilio received therapeutic exercises to develop strength, endurance and ROM for 40 minutes including:   Seated extension x 5 min 3#  Quad set towel x 30  Quad set flat x 30  Bike x 8 min  6 in step downs 3x10  PIilates SLS x 30  Shuttle SL 2.5c 3x10  Shuttle 2-1 2c 3x10  Pilates SLS 2x1 min  Side steps x 2 laps  SL squats 3 x 10   Single RDLs x 10   Chair split squats x 10  SLS 3x10    Virgilio received the following manual therapy techniques: Joint mobilizations and Soft tissue Mobilization were applied to the: left knee for 5 minutes.   PROM: ext  Patellar mobs  Fat pad mobs      Written Home Exercises Provided: updated as per therex list  Pt demo good understanding of the  education provided. Virgilio demonstrated good return demonstration of activities.     Education provided:  Virgilio verbalized good understanding of education provided.   No spiritual or educational barriers to learning identified.    Assessment     Single leg stability and end range flexion continue to improve. Continue to progress eccentric loading.     This is a 57 y.o. male referred to outpatient physical therapy and presents with a medical diagnosis of left knee menisectomy and demonstrates limitations as described in the problem list Pt prognosis is Good. Pt will continue to benefit from skilled outpatient physical therapy to address the deficits listed below in the problem list, provide pt/family education and to maximize pt's level of independence in the home and community environment.    Will the patient continue to benefit from skilled PT intervention? yes        Medical necessity is demonstrated by:   - Pain limits function of effected part for all activities  - Unable to participate in daily activities   - Requires skilled supervision to complete and progress HEP  - Fall risk - impaired balance   - Continued inability to participate in vocational pursuits    Short Term Goals (6 Weeks):  - Pt will increase ROM of left knee = right knee  - Pt will increase strength of bilateral hip abd = 5/5  - Decrease Pain to 0/10 with ADLs  - Pt to self correct posture independently.  - Pt independent with HEP with progressions.      Long Term Goals (16-18 Weeks):  - Pt able to SLS without valgus 3x10  - Pt with normal loading mechanics with lunge walks.  - Pt with Ybalance = 94% of uninvolved.  - Pt with normalized jogging mechanics with 0/10 pain.       Plan   Continue with established Plan of Care towards PT goals.      Therapist: EDDIE MIMS, PT

## 2018-04-27 ENCOUNTER — CLINICAL SUPPORT (OUTPATIENT)
Dept: REHABILITATION | Facility: HOSPITAL | Age: 58
End: 2018-04-27
Attending: ORTHOPAEDIC SURGERY
Payer: COMMERCIAL

## 2018-04-27 DIAGNOSIS — M25.562 LEFT ANTERIOR KNEE PAIN: Primary | ICD-10-CM

## 2018-04-27 PROCEDURE — 97110 THERAPEUTIC EXERCISES: CPT | Mod: PN | Performed by: PHYSICAL THERAPIST

## 2018-04-27 NOTE — PROGRESS NOTES
Physical Therapy Daily Note     Date: 04/27/2018  Name: Virgilio Maher Jr.  Clinic Number: [id number]  Diagnosis: PROCEDURES(S) PERFORMED:   1. Left  Arthroscopy, debridement/shaving of articular cartilage (chondroplasty) 05731  2.  Left  Arthroscopy, with meniscectomy (medial OR lateral) 74935, medial  3.  Left  Arthroscopy, knee, for removal of loose body or foreign body 73860  4.  Left  Amniox Arthrocentesis, 60028  Encounter Diagnosis   Name Primary?    Left anterior knee pain Yes     Physician: Aflredito Babcock MD    Evaluation Date: 2/28/18  Date of Surgery: 3/1/18  Visit #:  19  Start Time:  9:00   Stop Time:  10:00    Total Treatment Time: 60      Precautions: PHYSICAL THERAPY:  The patient should begin physical therapy on postoperative   day # 3 and will be advanced to outpatient therapy as soon as   Possible following discharge.  Weight bearing:as tolerated  left leg  Range of Motion:Full normal motion symmetric to opposite side    Subjective     Pt reports the knee is doing well. It is getting stronger.    Pain: 1/10    Objective     Measurements taken: none    Patient received individual therapy to increase strength, endurance and ROM with activities as follows:     Virgilio received therapeutic exercises to develop strength, endurance and ROM for 40 minutes including:   Seated extension x 5 min 3#  Quad set towel x 30  Quad set flat x 30  Bike x 8 min  6 in step downs 3x10  PIilates SLS x 30  Shuttle SL 2.5c 3x10  Shuttle 2-1 2c 3x10  Pilates SLS 2x1 min  Side steps x 2 laps  SL squats 3 x 10   Single RDLs x 10   Chair split squats x 10  SLS 3x10  Lunge walk with stick x 30     Virgilio received the following manual therapy techniques: Joint mobilizations and Soft tissue Mobilization were applied to the: left knee for 5 minutes.   PROM: ext  Patellar mobs  Fat pad mobs      Written Home Exercises Provided: updated as per therex list  Pt demo good  understanding of the education provided. Virgilio demonstrated good return demonstration of activities.     Education provided:  Virgilio verbalized good understanding of education provided.   No spiritual or educational barriers to learning identified.    Assessment     Quad control and single leg stability continue to improve with rx. Continue to progress as tolerated.     This is a 57 y.o. male referred to outpatient physical therapy and presents with a medical diagnosis of left knee menisectomy and demonstrates limitations as described in the problem list Pt prognosis is Good. Pt will continue to benefit from skilled outpatient physical therapy to address the deficits listed below in the problem list, provide pt/family education and to maximize pt's level of independence in the home and community environment.    Will the patient continue to benefit from skilled PT intervention? yes        Medical necessity is demonstrated by:   - Pain limits function of effected part for all activities  - Unable to participate in daily activities   - Requires skilled supervision to complete and progress HEP  - Fall risk - impaired balance   - Continued inability to participate in vocational pursuits    Short Term Goals (6 Weeks):  - Pt will increase ROM of left knee = right knee  - Pt will increase strength of bilateral hip abd = 5/5  - Decrease Pain to 0/10 with ADLs  - Pt to self correct posture independently.  - Pt independent with HEP with progressions.      Long Term Goals (16-18 Weeks):  - Pt able to SLS without valgus 3x10  - Pt with normal loading mechanics with lunge walks.  - Pt with Ybalance = 94% of uninvolved.  - Pt with normalized jogging mechanics with 0/10 pain.       Plan   Continue with established Plan of Care towards PT goals.      Therapist: EDDIE MIMS, PT

## 2018-04-30 ENCOUNTER — CLINICAL SUPPORT (OUTPATIENT)
Dept: REHABILITATION | Facility: HOSPITAL | Age: 58
End: 2018-04-30
Attending: ORTHOPAEDIC SURGERY
Payer: COMMERCIAL

## 2018-04-30 DIAGNOSIS — M25.562 LEFT ANTERIOR KNEE PAIN: Primary | ICD-10-CM

## 2018-04-30 PROCEDURE — 97110 THERAPEUTIC EXERCISES: CPT | Mod: PN | Performed by: PHYSICAL THERAPIST

## 2018-04-30 NOTE — PROGRESS NOTES
Physical Therapy Daily Note     Date: 04/30/2018  Name: Virgilio Maher Jr.  Clinic Number: [id number]  Diagnosis: PROCEDURES(S) PERFORMED:   1. Left  Arthroscopy, debridement/shaving of articular cartilage (chondroplasty) 64969  2.  Left  Arthroscopy, with meniscectomy (medial OR lateral) 72626, medial  3.  Left  Arthroscopy, knee, for removal of loose body or foreign body 85267  4.  Left  Amniox Arthrocentesis, 78776  Encounter Diagnosis   Name Primary?    Left anterior knee pain Yes     Physician: Alfredito Babcock MD    Evaluation Date: 2/28/18  Date of Surgery: 3/1/18  Visit #:  20  Start Time:  9:30   Stop Time:  10:30    Total Treatment Time: 60      Precautions: PHYSICAL THERAPY:  The patient should begin physical therapy on postoperative   day # 3 and will be advanced to outpatient therapy as soon as   Possible following discharge.  Weight bearing:as tolerated  left leg  Range of Motion:Full normal motion symmetric to opposite side    Subjective     Pt reports the knee is getting stronger.     Pain: 1/10    Objective     Measurements taken: none    Patient received individual therapy to increase strength, endurance and ROM with activities as follows:     Virgilio received therapeutic exercises to develop strength, endurance and ROM for 40 minutes including:   Seated extension x 5 min 3#  Quad set towel x 30  Quad set flat x 30  Bike x 8 min  6 in step downs 3x10 with reach  PIilates SLS x 30  Shuttle SL 2.5c 3x10  Shuttle 2-1 2c 3x10  Pilates SLS 2x1 min  Side steps x 2 laps  SL squats 3 x 10   Single RDLs x 10   Chair split squats x 10  SLS 3x10  Lunge walk with stick x 30     Virgilio received the following manual therapy techniques: Joint mobilizations and Soft tissue Mobilization were applied to the: left knee for 5 minutes.   PROM: ext  Patellar mobs  Fat pad mobs      Written Home Exercises Provided: updated as per therex list  Pt demo good  understanding of the education provided. Virgilio demonstrated good return demonstration of activities.     Education provided:  Virgilio verbalized good understanding of education provided.   No spiritual or educational barriers to learning identified.    Assessment     Single leg stability and eccentric control continue to improve with rx. Continue to progress as tolerated.     This is a 57 y.o. male referred to outpatient physical therapy and presents with a medical diagnosis of left knee menisectomy and demonstrates limitations as described in the problem list Pt prognosis is Good. Pt will continue to benefit from skilled outpatient physical therapy to address the deficits listed below in the problem list, provide pt/family education and to maximize pt's level of independence in the home and community environment.    Will the patient continue to benefit from skilled PT intervention? yes        Medical necessity is demonstrated by:   - Pain limits function of effected part for all activities  - Unable to participate in daily activities   - Requires skilled supervision to complete and progress HEP  - Fall risk - impaired balance   - Continued inability to participate in vocational pursuits    Short Term Goals (6 Weeks):  - Pt will increase ROM of left knee = right knee  - Pt will increase strength of bilateral hip abd = 5/5  - Decrease Pain to 0/10 with ADLs  - Pt to self correct posture independently.  - Pt independent with HEP with progressions.      Long Term Goals (16-18 Weeks):  - Pt able to SLS without valgus 3x10  - Pt with normal loading mechanics with lunge walks.  - Pt with Ybalance = 94% of uninvolved.  - Pt with normalized jogging mechanics with 0/10 pain.       Plan   Continue with established Plan of Care towards PT goals.      Therapist: EDDIE MIMS, PT

## 2018-05-03 ENCOUNTER — CLINICAL SUPPORT (OUTPATIENT)
Dept: REHABILITATION | Facility: HOSPITAL | Age: 58
End: 2018-05-03
Attending: ORTHOPAEDIC SURGERY
Payer: COMMERCIAL

## 2018-05-03 DIAGNOSIS — M25.562 LEFT ANTERIOR KNEE PAIN: Primary | ICD-10-CM

## 2018-05-03 PROCEDURE — 97140 MANUAL THERAPY 1/> REGIONS: CPT | Mod: PN | Performed by: PHYSICAL THERAPIST

## 2018-05-03 PROCEDURE — 97110 THERAPEUTIC EXERCISES: CPT | Mod: PN | Performed by: PHYSICAL THERAPIST

## 2018-05-03 NOTE — PROGRESS NOTES
"                                                  Physical Therapy Daily Note     Date: 05/03/2018  Name: Virgilio Maher Jr.  Clinic Number: [id number]  Diagnosis: PROCEDURES(S) PERFORMED:   1. Left  Arthroscopy, debridement/shaving of articular cartilage (chondroplasty) 82959  2.  Left  Arthroscopy, with meniscectomy (medial OR lateral) 33837, medial  3.  Left  Arthroscopy, knee, for removal of loose body or foreign body 23842  4.  Left  Amniox Arthrocentesis, 63392  Encounter Diagnosis   Name Primary?    Left anterior knee pain Yes     Physician: Alfredito Babcock MD    Evaluation Date: 2/28/18  Date of Surgery: 3/1/18  Visit #:  21  Start Time:  8:00   Stop Time: 9:00    Total Treatment Time: 60      Precautions: PHYSICAL THERAPY:  The patient should begin physical therapy on postoperative   day # 3 and will be advanced to outpatient therapy as soon as   Possible following discharge.  Weight bearing:as tolerated  left leg  Range of Motion:Full normal motion symmetric to opposite side    Subjective     Pt reports the knee is sore. Its been achy.     Pain: 1/10    Objective     Measurements taken: none    Patient received individual therapy to increase strength, endurance and ROM with activities as follows:     Virgilio received therapeutic exercises to develop strength, endurance and ROM for 40 minutes including:   Seated extension x 5 min 3#  Quad set towel x 30  Quad set flat x 30  Bike x 8 min  6 in step downs 3x10 with reach  PIilates SLS x 30  Shuttle SL 2.5c 3x10  Shuttle 2-1 2c 3x10  Pilates SLS 2x1 min  Side steps x 2 laps  SL squats 3 x 10   Single RDLs x 10   Chair split squats x 10  SLS 3x10  Lunge walk with stick x 30   16" step ups x30    Virgilio received the following manual therapy techniques: Joint mobilizations and Soft tissue Mobilization were applied to the: left knee for 5 minutes.   PROM: ext  Patellar mobs  Fat pad mobs      Written Home Exercises Provided: updated as per therex list  Pt " nathaly good understanding of the education provided. Virgilio demonstrated good return demonstration of activities.     Education provided:  Virgilio verbalized good understanding of education provided.   No spiritual or educational barriers to learning identified.    Assessment     Single leg stability and patellar mobility continue to gradually improve. Pt without pain at end of rx.     This is a 57 y.o. male referred to outpatient physical therapy and presents with a medical diagnosis of left knee menisectomy and demonstrates limitations as described in the problem list Pt prognosis is Good. Pt will continue to benefit from skilled outpatient physical therapy to address the deficits listed below in the problem list, provide pt/family education and to maximize pt's level of independence in the home and community environment.    Will the patient continue to benefit from skilled PT intervention? yes        Medical necessity is demonstrated by:   - Pain limits function of effected part for all activities  - Unable to participate in daily activities   - Requires skilled supervision to complete and progress HEP  - Fall risk - impaired balance   - Continued inability to participate in vocational pursuits    Short Term Goals (6 Weeks):  - Pt will increase ROM of left knee = right knee  - Pt will increase strength of bilateral hip abd = 5/5  - Decrease Pain to 0/10 with ADLs  - Pt to self correct posture independently.  - Pt independent with HEP with progressions.      Long Term Goals (16-18 Weeks):  - Pt able to SLS without valgus 3x10  - Pt with normal loading mechanics with lunge walks.  - Pt with Ybalance = 94% of uninvolved.  - Pt with normalized jogging mechanics with 0/10 pain.       Plan   Continue with established Plan of Care towards PT goals.      Therapist: EDDIE MIMS, PT

## 2018-05-10 ENCOUNTER — CLINICAL SUPPORT (OUTPATIENT)
Dept: REHABILITATION | Facility: HOSPITAL | Age: 58
End: 2018-05-10
Attending: ORTHOPAEDIC SURGERY
Payer: COMMERCIAL

## 2018-05-10 DIAGNOSIS — M25.562 LEFT ANTERIOR KNEE PAIN: Primary | ICD-10-CM

## 2018-05-10 PROCEDURE — 97110 THERAPEUTIC EXERCISES: CPT | Mod: PN | Performed by: PHYSICAL THERAPIST

## 2018-05-10 NOTE — PROGRESS NOTES
Physical Therapy Daily Note     Date: 05/10/2018  Name: Virgilio Maher Jr.  Clinic Number: [id number]  Diagnosis: PROCEDURES(S) PERFORMED:   1. Left  Arthroscopy, debridement/shaving of articular cartilage (chondroplasty) 48868  2.  Left  Arthroscopy, with meniscectomy (medial OR lateral) 20808, medial  3.  Left  Arthroscopy, knee, for removal of loose body or foreign body 74780  4.  Left  Amniox Arthrocentesis, 55164  Encounter Diagnosis   Name Primary?    Left anterior knee pain Yes     Physician: Barbara Leija MD    Evaluation Date: 2/28/18  Date of Surgery: 3/1/18  Visit #:  22  Start Time:  1:00   Stop Time: 2:00    Total Treatment Time: 60      Precautions: PHYSICAL THERAPY:  The patient should begin physical therapy on postoperative   day # 3 and will be advanced to outpatient therapy as soon as   Possible following discharge.  Weight bearing:as tolerated  left leg  Range of Motion:Full normal motion symmetric to opposite side    Subjective     Pt reports the knee is still achy but getting better.   Pain: 1/10    Objective     Measurements taken: none    Patient received individual therapy to increase strength, endurance and ROM with activities as follows:     Virgilio received therapeutic exercises to develop strength, endurance and ROM for 40 minutes including:   Seated extension x 5 min 3#  Quad set towel x 30  Quad set flat x 30  Bike x 8 min  6 in step downs 3x10 with reach  PIilates SLS x 30  Shuttle SL 2.5c 3x10  Shuttle 2-1 2c 3x10  Pilates SLS 2x1 min  Side steps x 2 laps  SL squats 3 x 10   Single RDLs x 10   Chair split squats x 10  SLS 3x10  Lunge walk with stick x 30   Split squats 2x10    Virgilio received the following manual therapy techniques: Joint mobilizations and Soft tissue Mobilization were applied to the: left knee for 5 minutes.   PROM: ext  Patellar mobs  Fat pad mobs      Written Home Exercises Provided: updated as per therex list  Pt  nathaly good understanding of the education provided. Virgilio demonstrated good return demonstration of activities.     Education provided:  Virgilio verbalized good understanding of education provided.   No spiritual or educational barriers to learning identified.    Assessment     Eccentric control and single leg stability continue to improve with rx. Continue to progress as tolerated.     This is a 57 y.o. male referred to outpatient physical therapy and presents with a medical diagnosis of left knee menisectomy and demonstrates limitations as described in the problem list Pt prognosis is Good. Pt will continue to benefit from skilled outpatient physical therapy to address the deficits listed below in the problem list, provide pt/family education and to maximize pt's level of independence in the home and community environment.    Will the patient continue to benefit from skilled PT intervention? yes        Medical necessity is demonstrated by:   - Pain limits function of effected part for all activities  - Unable to participate in daily activities   - Requires skilled supervision to complete and progress HEP  - Fall risk - impaired balance   - Continued inability to participate in vocational pursuits    Short Term Goals (6 Weeks):  - Pt will increase ROM of left knee = right knee  - Pt will increase strength of bilateral hip abd = 5/5  - Decrease Pain to 0/10 with ADLs  - Pt to self correct posture independently.  - Pt independent with HEP with progressions.      Long Term Goals (16-18 Weeks):  - Pt able to SLS without valgus 3x10  - Pt with normal loading mechanics with lunge walks.  - Pt with Ybalance = 94% of uninvolved.  - Pt with normalized jogging mechanics with 0/10 pain.       Plan   Continue with established Plan of Care towards PT goals.      Therapist: EDDIE MIMS, PT

## 2018-05-15 ENCOUNTER — CLINICAL SUPPORT (OUTPATIENT)
Dept: REHABILITATION | Facility: HOSPITAL | Age: 58
End: 2018-05-15
Attending: ORTHOPAEDIC SURGERY
Payer: COMMERCIAL

## 2018-05-15 DIAGNOSIS — M25.562 LEFT ANTERIOR KNEE PAIN: Primary | ICD-10-CM

## 2018-05-15 PROCEDURE — 97110 THERAPEUTIC EXERCISES: CPT | Mod: PN | Performed by: PHYSICAL THERAPIST

## 2018-05-15 NOTE — PROGRESS NOTES
Physical Therapy Daily Note     Date: 05/15/2018  Name: Virgilio Maher Jr.  Clinic Number: [id number]  Diagnosis: PROCEDURES(S) PERFORMED:   1. Left  Arthroscopy, debridement/shaving of articular cartilage (chondroplasty) 07022  2.  Left  Arthroscopy, with meniscectomy (medial OR lateral) 56876, medial  3.  Left  Arthroscopy, knee, for removal of loose body or foreign body 11212  4.  Left  Amniox Arthrocentesis, 41605  Encounter Diagnosis   Name Primary?    Left anterior knee pain Yes     Physician: Alfredito Babcock MD    Evaluation Date: 2/28/18  Date of Surgery: 3/1/18  Visit #:  23  Start Time:  8:00   Stop Time: 9:00    Total Treatment Time: 60      Precautions: PHYSICAL THERAPY:  The patient should begin physical therapy on postoperative   day # 3 and will be advanced to outpatient therapy as soon as   Possible following discharge.  Weight bearing:as tolerated  left leg  Range of Motion:Full normal motion symmetric to opposite side    Subjective     Pt reports the knee is still sore and achy.   Pain: 1/10    Objective     Measurements taken: none    Patient received individual therapy to increase strength, endurance and ROM with activities as follows:     Virgilio received therapeutic exercises to develop strength, endurance and ROM for 40 minutes including:   Seated extension x 5 min 3#  Quad set towel x 30  Quad set flat x 30  6 in step downs 3x10 with reach  PIilates SLS x 30  Shuttle SL 2.5c 3x10  Shuttle 2-1 2c 3x10  Pilates SLS 2x1 min  Side steps x 2 laps  SL squats 3 x 10   SLS 3x10  Lunge walk with stick x 30     Virgilio received the following manual therapy techniques: Joint mobilizations and Soft tissue Mobilization were applied to the: left knee for 5 minutes.   PROM: ext  Patellar mobs  Fat pad mobs      Written Home Exercises Provided: updated as per therex list  Pt demo good understanding of the education provided. Virgilio demonstrated good  return demonstration of activities.     Education provided:  Virgilio verbalized good understanding of education provided.   No spiritual or educational barriers to learning identified.    Assessment     Quad control and single leg stability continue to improve despite lingering symptoms. Pt reports being sore from cutting the grass. Continue to  Progress optimal quad strength.     This is a 57 y.o. male referred to outpatient physical therapy and presents with a medical diagnosis of left knee menisectomy and demonstrates limitations as described in the problem list Pt prognosis is Good. Pt will continue to benefit from skilled outpatient physical therapy to address the deficits listed below in the problem list, provide pt/family education and to maximize pt's level of independence in the home and community environment.    Will the patient continue to benefit from skilled PT intervention? yes        Medical necessity is demonstrated by:   - Pain limits function of effected part for all activities  - Unable to participate in daily activities   - Requires skilled supervision to complete and progress HEP  - Fall risk - impaired balance   - Continued inability to participate in vocational pursuits    Short Term Goals (6 Weeks):  - Pt will increase ROM of left knee = right knee  - Pt will increase strength of bilateral hip abd = 5/5  - Decrease Pain to 0/10 with ADLs  - Pt to self correct posture independently.  - Pt independent with HEP with progressions.      Long Term Goals (16-18 Weeks):  - Pt able to SLS without valgus 3x10  - Pt with normal loading mechanics with lunge walks.  - Pt with Ybalance = 94% of uninvolved.  - Pt with normalized jogging mechanics with 0/10 pain.       Plan   Continue with established Plan of Care towards PT goals.      Therapist: EDDIE MIMS, PT

## 2018-05-23 ENCOUNTER — CLINICAL SUPPORT (OUTPATIENT)
Dept: REHABILITATION | Facility: HOSPITAL | Age: 58
End: 2018-05-23
Attending: ORTHOPAEDIC SURGERY
Payer: COMMERCIAL

## 2018-05-23 DIAGNOSIS — M25.562 LEFT ANTERIOR KNEE PAIN: Primary | ICD-10-CM

## 2018-05-23 PROCEDURE — 97110 THERAPEUTIC EXERCISES: CPT | Mod: PN | Performed by: PHYSICAL THERAPIST

## 2018-05-23 NOTE — PROGRESS NOTES
Physical Therapy Daily Note     Date: 05/23/2018  Name: Virgilio Maher Jr.  Clinic Number: [id number]  Diagnosis: PROCEDURES(S) PERFORMED:   1. Left  Arthroscopy, debridement/shaving of articular cartilage (chondroplasty) 40908  2.  Left  Arthroscopy, with meniscectomy (medial OR lateral) 97475, medial  3.  Left  Arthroscopy, knee, for removal of loose body or foreign body 02725  4.  Left  Amniox Arthrocentesis, 64353  No diagnosis found.  Physician: Alfredito Babcock MD    Evaluation Date: 2/28/18  Date of Surgery: 3/1/18  Visit #:  24  Start Time:  8:00   Stop Time: 9:00    Total Treatment Time: 60      Precautions: PHYSICAL THERAPY:  The patient should begin physical therapy on postoperative   day # 3 and will be advanced to outpatient therapy as soon as   Possible following discharge.  Weight bearing:as tolerated  left leg  Range of Motion:Full normal motion symmetric to opposite side    Subjective     Pt reports the knee is sore and achy. The knee cap is still. I didn't work on it as much last week.   Pain: 1/10    Objective     Measurements taken: none    Patient received individual therapy to increase strength, endurance and ROM with activities as follows:     Virgilio received therapeutic exercises to develop strength, endurance and ROM for 40 minutes including:   Seated extension x 5 min 3#  Quad set towel x 30  Quad set flat x 30  6 in step downs 3x10 with reach  Shuttle SL 2.5c 3x10  Shuttle 2-1 2c 3x10  Side steps x 2 laps  EOT bridges 3x10       Virgilio received the following manual therapy techniques: Joint mobilizations and Soft tissue Mobilization were applied to the: left knee for 5 minutes.   PROM: ext  Patellar mobs  Fat pad mobs      Written Home Exercises Provided: updated as per therex list  Pt demo good understanding of the education provided. Virgilio demonstrated good return demonstration of activities.     Education provided:  Virgilio  verbalized good understanding of education provided.   No spiritual or educational barriers to learning identified.    Assessment     Quad control and functional hip strength continue improve with rx. Continue to progress patellar mobility and end range quad control.     This is a 57 y.o. male referred to outpatient physical therapy and presents with a medical diagnosis of left knee menisectomy and demonstrates limitations as described in the problem list Pt prognosis is Good. Pt will continue to benefit from skilled outpatient physical therapy to address the deficits listed below in the problem list, provide pt/family education and to maximize pt's level of independence in the home and community environment.    Will the patient continue to benefit from skilled PT intervention? yes        Medical necessity is demonstrated by:   - Pain limits function of effected part for all activities  - Unable to participate in daily activities   - Requires skilled supervision to complete and progress HEP  - Fall risk - impaired balance   - Continued inability to participate in vocational pursuits    Short Term Goals (6 Weeks):  - Pt will increase ROM of left knee = right knee  - Pt will increase strength of bilateral hip abd = 5/5  - Decrease Pain to 0/10 with ADLs  - Pt to self correct posture independently.  - Pt independent with HEP with progressions.      Long Term Goals (16-18 Weeks):  - Pt able to SLS without valgus 3x10  - Pt with normal loading mechanics with lunge walks.  - Pt with Ybalance = 94% of uninvolved.  - Pt with normalized jogging mechanics with 0/10 pain.       Plan   Continue with established Plan of Care towards PT goals.      Therapist: EDDIE MIMS, PT

## 2018-05-31 ENCOUNTER — CLINICAL SUPPORT (OUTPATIENT)
Dept: REHABILITATION | Facility: HOSPITAL | Age: 58
End: 2018-05-31
Attending: ORTHOPAEDIC SURGERY
Payer: COMMERCIAL

## 2018-05-31 DIAGNOSIS — M25.562 LEFT ANTERIOR KNEE PAIN: Primary | ICD-10-CM

## 2018-05-31 PROCEDURE — 97110 THERAPEUTIC EXERCISES: CPT | Mod: PN | Performed by: PHYSICAL THERAPIST

## 2018-05-31 PROCEDURE — 97140 MANUAL THERAPY 1/> REGIONS: CPT | Mod: PN | Performed by: PHYSICAL THERAPIST

## 2018-05-31 NOTE — PROGRESS NOTES
Physical Therapy Daily Note     Date: 05/31/2018  Name: Virgilio Maher Jr.  Clinic Number: [id number]  Diagnosis: PROCEDURES(S) PERFORMED:   1. Left  Arthroscopy, debridement/shaving of articular cartilage (chondroplasty) 04919  2.  Left  Arthroscopy, with meniscectomy (medial OR lateral) 28518, medial  3.  Left  Arthroscopy, knee, for removal of loose body or foreign body 47339  4.  Left  Amniox Arthrocentesis, 87059  Encounter Diagnosis   Name Primary?    Left anterior knee pain Yes     Physician: Barbara Leija MD    Evaluation Date: 2/28/18  Date of Surgery: 3/1/18  Visit #:  25  Start Time:  9:00   Stop Time: 10:00    Total Treatment Time: 60      Precautions: PHYSICAL THERAPY:  The patient should begin physical therapy on postoperative   day # 3 and will be advanced to outpatient therapy as soon as   Possible following discharge.  Weight bearing:as tolerated  left leg  Range of Motion:Full normal motion symmetric to opposite side    Subjective     Pt reports the knee is sore and achy. The knee cap is better but still not moving great.   Pain: 1/10    Objective     Measurements taken: none    Patient received individual therapy to increase strength, endurance and ROM with activities as follows:     Virgilio received therapeutic exercises to develop strength, endurance and ROM for 40 minutes including:   Seated extension x 5 min 3#  Quad set towel x 30  Quad set flat x 30  6 in step downs 3x10 with reach  Shuttle SL 2.5c 3x10  Shuttle 2-1 2c 3x10  Side steps x 2 laps  EOT bridges 3x10  Shuttle DL 4c 3x10       Virgilio received the following manual therapy techniques: Joint mobilizations and Soft tissue Mobilization were applied to the: left knee for 10  minutes.   PROM: ext  Patellar mobs  Fat pad mobs  DN by Vamshi Quintero    Written Home Exercises Provided: updated as per therex list  Pt demo good understanding of the education provided. Virgilio demonstrated good  return demonstration of activities.     Education provided:  Virgilio verbalized good understanding of education provided.   No spiritual or educational barriers to learning identified.    Assessment     Improved symptoms and quad firing after DN and closed chain loading. Reports the knees feel great. Also states, I havent been using the  because its been bruising my knee.     This is a 57 y.o. male referred to outpatient physical therapy and presents with a medical diagnosis of left knee menisectomy and demonstrates limitations as described in the problem list Pt prognosis is Good. Pt will continue to benefit from skilled outpatient physical therapy to address the deficits listed below in the problem list, provide pt/family education and to maximize pt's level of independence in the home and community environment.    Will the patient continue to benefit from skilled PT intervention? yes        Medical necessity is demonstrated by:   - Pain limits function of effected part for all activities  - Unable to participate in daily activities   - Requires skilled supervision to complete and progress HEP  - Fall risk - impaired balance   - Continued inability to participate in vocational pursuits    Short Term Goals (6 Weeks):  - Pt will increase ROM of left knee = right knee  - Pt will increase strength of bilateral hip abd = 5/5  - Decrease Pain to 0/10 with ADLs  - Pt to self correct posture independently.  - Pt independent with HEP with progressions.      Long Term Goals (16-18 Weeks):  - Pt able to SLS without valgus 3x10  - Pt with normal loading mechanics with lunge walks.  - Pt with Ybalance = 94% of uninvolved.  - Pt with normalized jogging mechanics with 0/10 pain.       Plan   Continue with established Plan of Care towards PT goals.      Therapist: EDDIE MIMS, PT

## 2018-06-04 ENCOUNTER — CLINICAL SUPPORT (OUTPATIENT)
Dept: REHABILITATION | Facility: HOSPITAL | Age: 58
End: 2018-06-04
Attending: ORTHOPAEDIC SURGERY
Payer: COMMERCIAL

## 2018-06-04 DIAGNOSIS — M25.562 LEFT ANTERIOR KNEE PAIN: Primary | ICD-10-CM

## 2018-06-04 PROCEDURE — 97110 THERAPEUTIC EXERCISES: CPT | Mod: PN | Performed by: PHYSICAL THERAPIST

## 2018-06-04 NOTE — PROGRESS NOTES
Physical Therapy Daily Note     Date: 06/04/2018  Name: Virgilio Maher Jr.  Clinic Number: [id number]  Diagnosis: PROCEDURES(S) PERFORMED:   1. Left  Arthroscopy, debridement/shaving of articular cartilage (chondroplasty) 90090  2.  Left  Arthroscopy, with meniscectomy (medial OR lateral) 24182, medial  3.  Left  Arthroscopy, knee, for removal of loose body or foreign body 27690  4.  Left  Amniox Arthrocentesis, 95724  Encounter Diagnosis   Name Primary?    Left anterior knee pain Yes     Physician: Barbara Leija MD    Evaluation Date: 2/28/18  Date of Surgery: 3/1/18  Visit #:  26  Start Time:  8:00   Stop Time: 9:00    Total Treatment Time: 60      Precautions: PHYSICAL THERAPY:  The patient should begin physical therapy on postoperative   day # 3 and will be advanced to outpatient therapy as soon as   Possible following discharge.  Weight bearing:as tolerated  left leg  Range of Motion:Full normal motion symmetric to opposite side    Subjective     Pt reports the knee continues to feel well.    Pain: 1/10    Objective     Measurements taken: none    Patient received individual therapy to increase strength, endurance and ROM with activities as follows:     Virgilio received therapeutic exercises to develop strength, endurance and ROM for 40 minutes including:   Seated extension x 5 min 3#  Quad set towel x 30  Quad set flat x 30  6 in step downs 3x10 with reach  Shuttle SL 2.5c 3x10  Shuttle 2-1 2c 3x10  Side steps x 2 laps  EOT bridges 3x10  Shuttle DL 4c 3x10       Virgilio received the following manual therapy techniques: Joint mobilizations and Soft tissue Mobilization were applied to the: left knee for 10  minutes.   PROM: ext  Patellar mobs  Fat pad mobs  DN by Vamshi Quintero    Written Home Exercises Provided: updated as per therex list  Pt demo good understanding of the education provided. Virgilio demonstrated good return demonstration of activities.      Education provided:  Virgilio verbalized good understanding of education provided.   No spiritual or educational barriers to learning identified.    Assessment     Loading mechanics continue to improve without pain. Continue to progress single leg stability work without pain.     This is a 57 y.o. male referred to outpatient physical therapy and presents with a medical diagnosis of left knee menisectomy and demonstrates limitations as described in the problem list Pt prognosis is Good. Pt will continue to benefit from skilled outpatient physical therapy to address the deficits listed below in the problem list, provide pt/family education and to maximize pt's level of independence in the home and community environment.    Will the patient continue to benefit from skilled PT intervention? yes        Medical necessity is demonstrated by:   - Pain limits function of effected part for all activities  - Unable to participate in daily activities   - Requires skilled supervision to complete and progress HEP  - Fall risk - impaired balance   - Continued inability to participate in vocational pursuits    Short Term Goals (6 Weeks):  - Pt will increase ROM of left knee = right knee  - Pt will increase strength of bilateral hip abd = 5/5  - Decrease Pain to 0/10 with ADLs  - Pt to self correct posture independently.  - Pt independent with HEP with progressions.      Long Term Goals (16-18 Weeks):  - Pt able to SLS without valgus 3x10  - Pt with normal loading mechanics with lunge walks.  - Pt with Ybalance = 94% of uninvolved.  - Pt with normalized jogging mechanics with 0/10 pain.       Plan   Continue with established Plan of Care towards PT goals.      Therapist: EDDIE MIMS, PT

## 2018-06-06 ENCOUNTER — CLINICAL SUPPORT (OUTPATIENT)
Dept: REHABILITATION | Facility: HOSPITAL | Age: 58
End: 2018-06-06
Attending: ORTHOPAEDIC SURGERY
Payer: COMMERCIAL

## 2018-06-06 PROCEDURE — 97110 THERAPEUTIC EXERCISES: CPT | Mod: PN | Performed by: PHYSICAL THERAPIST

## 2018-06-06 NOTE — PROGRESS NOTES
Physical Therapy Daily Note     Date: 06/06/2018  Name: Virgilio Maher Jr.  Clinic Number: [id number]  Diagnosis: PROCEDURES(S) PERFORMED:   1. Left  Arthroscopy, debridement/shaving of articular cartilage (chondroplasty) 11247  2.  Left  Arthroscopy, with meniscectomy (medial OR lateral) 23689, medial  3.  Left  Arthroscopy, knee, for removal of loose body or foreign body 73346  4.  Left  Amniox Arthrocentesis, 21314  No diagnosis found.  Physician: Barbara Leija MD    Evaluation Date: 2/28/18  Date of Surgery: 3/1/18  Visit #:  27  Start Time:  8:00   Stop Time: 9:00    Total Treatment Time: 60      Precautions: PHYSICAL THERAPY:  The patient should begin physical therapy on postoperative   day # 3 and will be advanced to outpatient therapy as soon as   Possible following discharge.  Weight bearing:as tolerated  left leg  Range of Motion:Full normal motion symmetric to opposite side    Subjective     Pt reports the knee has been a little sore but it is getting stronger.     Pain: 1/10    Objective     Measurements taken: none    Patient received individual therapy to increase strength, endurance and ROM with activities as follows:     Virgilio received therapeutic exercises to develop strength, endurance and ROM for 40 minutes including:   Patellar mobs x 30   Quad set towel x 30  6 in step downs 3x10 with reach  Shuttle DL 5c 3x10  Shuttle SL 2.5c 3x10  Shuttle 2-1 2c 3x10  Side steps x 2 laps  Shuttle DL 4c 3x10       Virgilio received the following manual therapy techniques: Joint mobilizations and Soft tissue Mobilization were applied to the: left knee for 10  minutes.   PROM: ext  Patellar mobs  Fat pad mobs  DN by Vamshi Quintero    Written Home Exercises Provided: updated as per therex list  Pt demo good understanding of the education provided. Virgilio demonstrated good return demonstration of activities.     Education provided:  Virgilio verbalized good understanding  of education provided.   No spiritual or educational barriers to learning identified.    Assessment     Improving eccentric control and single leg stability. Functional hip strength continues to improve as well. Continue to work on patellar mobility as it stiffens up in between visits.    This is a 57 y.o. male referred to outpatient physical therapy and presents with a medical diagnosis of left knee menisectomy and demonstrates limitations as described in the problem list Pt prognosis is Good. Pt will continue to benefit from skilled outpatient physical therapy to address the deficits listed below in the problem list, provide pt/family education and to maximize pt's level of independence in the home and community environment.    Will the patient continue to benefit from skilled PT intervention? yes        Medical necessity is demonstrated by:   - Pain limits function of effected part for all activities  - Unable to participate in daily activities   - Requires skilled supervision to complete and progress HEP  - Fall risk - impaired balance   - Continued inability to participate in vocational pursuits    Short Term Goals (6 Weeks):  - Pt will increase ROM of left knee = right knee  - Pt will increase strength of bilateral hip abd = 5/5  - Decrease Pain to 0/10 with ADLs  - Pt to self correct posture independently.  - Pt independent with HEP with progressions.      Long Term Goals (16-18 Weeks):  - Pt able to SLS without valgus 3x10  - Pt with normal loading mechanics with lunge walks.  - Pt with Ybalance = 94% of uninvolved.  - Pt with normalized jogging mechanics with 0/10 pain.       Plan   Continue with established Plan of Care towards PT goals.      Therapist: EDDIE MIMS, PT

## 2018-06-12 ENCOUNTER — CLINICAL SUPPORT (OUTPATIENT)
Dept: REHABILITATION | Facility: HOSPITAL | Age: 58
End: 2018-06-12
Attending: ORTHOPAEDIC SURGERY
Payer: COMMERCIAL

## 2018-06-12 DIAGNOSIS — M25.562 LEFT ANTERIOR KNEE PAIN: Primary | ICD-10-CM

## 2018-06-12 PROCEDURE — 97110 THERAPEUTIC EXERCISES: CPT | Mod: PN | Performed by: PHYSICAL THERAPIST

## 2018-06-12 NOTE — PROGRESS NOTES
Physical Therapy Daily Note     Date: 06/12/2018  Name: Virgilio Maher Jr.  Clinic Number: [id number]  Diagnosis: PROCEDURES(S) PERFORMED:   1. Left  Arthroscopy, debridement/shaving of articular cartilage (chondroplasty) 33590  2.  Left  Arthroscopy, with meniscectomy (medial OR lateral) 78260, medial  3.  Left  Arthroscopy, knee, for removal of loose body or foreign body 42039  4.  Left  Amniox Arthrocentesis, 98057  Encounter Diagnosis   Name Primary?    Left anterior knee pain Yes     Physician: Barbara Leija MD    Evaluation Date: 2/28/18  Date of Surgery: 3/1/18  Visit #:  28  Start Time:  10:00   Stop Time: 11:00    Total Treatment Time: 60      Precautions: PHYSICAL THERAPY:  The patient should begin physical therapy on postoperative   day # 3 and will be advanced to outpatient therapy as soon as   Possible following discharge.  Weight bearing:as tolerated  left leg  Range of Motion:Full normal motion symmetric to opposite side    Subjective     Pt reports the knee is finally getting stronger. I can tell.     Pain: 1/10    Objective     Measurements taken: none    Patient received individual therapy to increase strength, endurance and ROM with activities as follows:     Virgilio received therapeutic exercises to develop strength, endurance and ROM for 40 minutes including:   Patellar mobs x 30   Quad set towel x 30  6 in step downs 3x10 with reach  Shuttle DL 5c 3x10  Shuttle SL 2.5c 3x10  Shuttle 2-1 2c 3x10  Side steps x 2 laps  Shuttle DL 4c 3x10       Virgilio received the following manual therapy techniques: Joint mobilizations and Soft tissue Mobilization were applied to the: left knee for 10  minutes.   PROM: ext  Patellar mobs  Fat pad mobs  DN by Vamshi Quintero with PreMod    Written Home Exercises Provided: updated as per therex list  Pt demo good understanding of the education provided. Virgilio demonstrated good return demonstration of activities.      Education provided:  Virgilio verbalized good understanding of education provided.   No spiritual or educational barriers to learning identified.    Assessment     Quad firing and patellar mobility continue to improve with rx. Symptoms continue to improve with DN technique. Continue to progress functional single leg stability.     This is a 57 y.o. male referred to outpatient physical therapy and presents with a medical diagnosis of left knee menisectomy and demonstrates limitations as described in the problem list Pt prognosis is Good. Pt will continue to benefit from skilled outpatient physical therapy to address the deficits listed below in the problem list, provide pt/family education and to maximize pt's level of independence in the home and community environment.    Will the patient continue to benefit from skilled PT intervention? yes        Medical necessity is demonstrated by:   - Pain limits function of effected part for all activities  - Unable to participate in daily activities   - Requires skilled supervision to complete and progress HEP  - Fall risk - impaired balance   - Continued inability to participate in vocational pursuits    Short Term Goals (6 Weeks):  - Pt will increase ROM of left knee = right knee  - Pt will increase strength of bilateral hip abd = 5/5  - Decrease Pain to 0/10 with ADLs  - Pt to self correct posture independently.  - Pt independent with HEP with progressions.      Long Term Goals (16-18 Weeks):  - Pt able to SLS without valgus 3x10  - Pt with normal loading mechanics with lunge walks.  - Pt with Ybalance = 94% of uninvolved.  - Pt with normalized jogging mechanics with 0/10 pain.       Plan   Continue with established Plan of Care towards PT goals.      Therapist: EDDIE MIMS, PT

## 2018-06-18 ENCOUNTER — CLINICAL SUPPORT (OUTPATIENT)
Dept: REHABILITATION | Facility: HOSPITAL | Age: 58
End: 2018-06-18
Attending: ORTHOPAEDIC SURGERY
Payer: COMMERCIAL

## 2018-06-18 DIAGNOSIS — M25.562 LEFT ANTERIOR KNEE PAIN: Primary | ICD-10-CM

## 2018-06-18 PROCEDURE — 97110 THERAPEUTIC EXERCISES: CPT | Mod: PN | Performed by: PHYSICAL THERAPIST

## 2018-06-18 PROCEDURE — 97140 MANUAL THERAPY 1/> REGIONS: CPT | Mod: PN | Performed by: PHYSICAL THERAPIST

## 2018-06-18 NOTE — PROGRESS NOTES
Physical Therapy Daily Note     Date: 06/18/2018  Name: Virgilio Maher Jr.  Clinic Number: [id number]  Diagnosis: PROCEDURES(S) PERFORMED:   1. Left  Arthroscopy, debridement/shaving of articular cartilage (chondroplasty) 89203  2.  Left  Arthroscopy, with meniscectomy (medial OR lateral) 07126, medial  3.  Left  Arthroscopy, knee, for removal of loose body or foreign body 97981  4.  Left  Amniox Arthrocentesis, 60592  Encounter Diagnosis   Name Primary?    Left anterior knee pain Yes     Physician: Barbara Leija MD    Evaluation Date: 2/28/18  Date of Surgery: 3/1/18  Visit #:  29  Start Time:  5:00   Stop Time: 6:00    Total Treatment Time: 60      Precautions: PHYSICAL THERAPY:  The patient should begin physical therapy on postoperative   day # 3 and will be advanced to outpatient therapy as soon as   Possible following discharge.  Weight bearing:as tolerated  left leg  Range of Motion:Full normal motion symmetric to opposite side    Subjective     Pt reports the knee is finally getting stronger. I can tell. However, it is stiff and painful today coming off of a weekend in [place].    Pain: 6/10    Objective     Measurements taken:   Left knee ROM: 0-123  Right knee ROM: 4-0-133    Bilateral hip abd = 5/5    6 in step downs 3x10 without valgus  Hypomobile left patellar mobility at entry to clinic (more mobile after manual/patellar mobs)    Patient received individual therapy to increase strength, endurance and ROM with activities as follows:     Virgilio received therapeutic exercises to develop strength, endurance and ROM for 40 minutes including:   Patellar mobs x 30   Quad set towel x 30  6 in step downs 3x10 with reach  Shuttle DL 5c 3x10  Shuttle SL 2.5c 3x10  Shuttle 2-1 2c 3x10  Side steps x 2 laps  Shuttle DL 4c 3x10  Lunge flexion stretch x 30  SL RDLs 3x10       Virgilio received the following manual therapy techniques: Joint mobilizations and Soft  tissue Mobilization were applied to the: left knee for 10  minutes.   PROM: ext  Patellar mobs  Fat pad mobs  DN by Vamshi Quintero with PreMod    Written Home Exercises Provided: updated as per therex list  Pt demo good understanding of the education provided. Virgilio demonstrated good return demonstration of activities.     Education provided:  Virgilio verbalized good understanding of education provided.   No spiritual or educational barriers to learning identified.    Assessment     Pt with improving quad control and functional hip strength. However, pt continues to have higher ranges of pain with ADLs (flying, golf, descending stairs). Advised pt to refit  (see [facility]) and follow up with Dr. Leija in next 2 weeks. Pt continues to need skilled PT services to further improve functional LE strength, improve patellar mobility (especially after weekends), and inevitably improve tolerance to ADLs. See measurements above. Continue PT to meet all LTGs listed below.    This is a 57 y.o. male referred to outpatient physical therapy and presents with a medical diagnosis of left knee menisectomy and demonstrates limitations as described in the problem list Pt prognosis is Good. Pt will continue to benefit from skilled outpatient physical therapy to address the deficits listed below in the problem list, provide pt/family education and to maximize pt's level of independence in the home and community environment.    Will the patient continue to benefit from skilled PT intervention? yes        Medical necessity is demonstrated by:   - Pain limits function of effected part for all activities  - Unable to participate in daily activities   - Requires skilled supervision to complete and progress HEP  - Fall risk - impaired balance   - Continued inability to participate in vocational pursuits    Short Term Goals (6 Weeks):  - Pt will increase ROM of left knee = right knee Progressing  - Pt will increase strength of bilateral  hip abd = 5/5 MET  - Decrease Pain to 0/10 with ADLs Progressing  - Pt to self correct posture independently. MET  - Pt independent with HEP with progressions. MET     Long Term Goals (16-18 Weeks):  - Pt able to SLS without valgus 3x10   - Pt with normal loading mechanics with lunge walks.  - Pt with Ybalance = 94% of uninvolved.  - Pt with normalized jogging mechanics with 0/10 pain.       Plan   Continue with established Plan of Care towards PT goals as listed above.      Therapist: EDDIE MIMS, PT

## 2018-06-21 ENCOUNTER — CLINICAL SUPPORT (OUTPATIENT)
Dept: REHABILITATION | Facility: HOSPITAL | Age: 58
End: 2018-06-21
Attending: ORTHOPAEDIC SURGERY
Payer: COMMERCIAL

## 2018-06-21 DIAGNOSIS — M25.562 LEFT ANTERIOR KNEE PAIN: Primary | ICD-10-CM

## 2018-06-21 NOTE — PROGRESS NOTES
Physical Therapy Daily Note     Date: 06/21/2018  Name: Virgilio Maher Jr.  Clinic Number: [id number]  Diagnosis: PROCEDURES(S) PERFORMED:   1. Left  Arthroscopy, debridement/shaving of articular cartilage (chondroplasty) 66646  2.  Left  Arthroscopy, with meniscectomy (medial OR lateral) 10745, medial  3.  Left  Arthroscopy, knee, for removal of loose body or foreign body 39995  4.  Left  Amniox Arthrocentesis, 11069  Encounter Diagnosis   Name Primary?    Left anterior knee pain Yes     Physician: Barbara Leija MD    Evaluation Date: 2/28/18  Date of Surgery: 3/1/18  Visit #:  [id number]  Start Time:  5:00   Stop Time: 6:00    Total Treatment Time: 60      Precautions: PHYSICAL THERAPY:  The patient should begin physical therapy on postoperative   day # 3 and will be advanced to outpatient therapy as soon as   Possible following discharge.  Weight bearing:as tolerated  left leg  Range of Motion:Full normal motion symmetric to opposite side    Subjective     Pt reports the knee is finally getting stronger the  continues to hurt me though and I feel like Cindy lost motion with it.     Pain: 6/10    Objective     Measurements taken:   Left knee ROM: 0-123  Right knee ROM: 4-0-133    Bilateral hip abd = 5/5    6 in step downs 3x10 without valgus  Hypomobile left patellar mobility at entry to clinic (more mobile after manual/patellar mobs)    Patient received individual therapy to increase strength, endurance and ROM with activities as follows:     Virgilio received therapeutic exercises to develop strength, endurance and ROM for 40 minutes including:   Patellar mobs x 30   Quad set towel x 30  6 in step downs 3x10 with reach  Shuttle DL 5c 3x10  Shuttle SL 2.5c 3x10  Shuttle 2-1 2c 3x10  Side steps x 2 laps  Shuttle DL 4c 3x10  Lunge flexion stretch x 30  SL RDLs 3x10       Virgilio received the following manual therapy techniques: Joint mobilizations and Soft tissue  Mobilization were applied to the: left knee for 10  minutes.   PROM: ext  Patellar mobs  Fat pad mobs  DN by Vamshi Quintero with PreMod    Written Home Exercises Provided: updated as per therex list  Pt demo good understanding of the education provided. Virgilio demonstrated good return demonstration of activities.     Education provided:  Virgilio verbalized good understanding of education provided.   No spiritual or educational barriers to learning identified.    Assessment     Pt with improving quad control and functional hip strength. Advised to follow up with Oneil Villa for  fitting issues (loss of extension and pain with wearing ).     This is a 57 y.o. male referred to outpatient physical therapy and presents with a medical diagnosis of left knee menisectomy and demonstrates limitations as described in the problem list Pt prognosis is Good. Pt will continue to benefit from skilled outpatient physical therapy to address the deficits listed below in the problem list, provide pt/family education and to maximize pt's level of independence in the home and community environment.    Will the patient continue to benefit from skilled PT intervention? yes        Medical necessity is demonstrated by:   - Pain limits function of effected part for all activities  - Unable to participate in daily activities   - Requires skilled supervision to complete and progress HEP  - Fall risk - impaired balance   - Continued inability to participate in vocational pursuits    Short Term Goals (6 Weeks):  - Pt will increase ROM of left knee = right knee Progressing  - Pt will increase strength of bilateral hip abd = 5/5 MET  - Decrease Pain to 0/10 with ADLs Progressing  - Pt to self correct posture independently. MET  - Pt independent with HEP with progressions. MET     Long Term Goals (16-18 Weeks):  - Pt able to SLS without valgus 3x10   - Pt with normal loading mechanics with lunge walks.  - Pt with Ybalance = 94% of  uninvolved.  - Pt with normalized jogging mechanics with 0/10 pain.       Plan   Continue with established Plan of Care towards PT goals as listed above.      Therapist: EDDIE MIMS, PT

## 2018-08-06 DIAGNOSIS — R93.89 ABNORMAL X-RAY: ICD-10-CM

## 2018-08-06 DIAGNOSIS — M17.32 POST-TRAUMATIC OSTEOARTHRITIS OF LEFT KNEE: ICD-10-CM

## 2018-08-06 DIAGNOSIS — M17.32 POST-TRAUMATIC OSTEOARTHRITIS OF LEFT KNEE: Primary | ICD-10-CM

## 2018-08-06 DIAGNOSIS — M17.31 POST-TRAUMATIC OSTEOARTHRITIS OF RIGHT KNEE: Primary | ICD-10-CM

## 2018-08-07 ENCOUNTER — HOSPITAL ENCOUNTER (OUTPATIENT)
Dept: RADIOLOGY | Facility: HOSPITAL | Age: 58
Discharge: HOME | End: 2018-08-07
Attending: ORTHOPAEDIC SURGERY
Payer: COMMERCIAL

## 2018-08-07 DIAGNOSIS — R93.89 ABNORMAL X-RAY: ICD-10-CM

## 2018-08-07 DIAGNOSIS — M17.31 POST-TRAUMATIC OSTEOARTHRITIS OF RIGHT KNEE: ICD-10-CM

## 2018-08-07 PROCEDURE — 73721 MRI JNT OF LWR EXTRE W/O DYE: CPT | Mod: TC,PO,RT

## 2018-08-07 PROCEDURE — 73721 MRI JNT OF LWR EXTRE W/O DYE: CPT | Mod: 26,RT,, | Performed by: RADIOLOGY

## 2018-08-08 ENCOUNTER — OFFICE VISIT (OUTPATIENT)
Dept: SPORTS MEDICINE | Facility: CLINIC | Age: 58
End: 2018-08-08
Payer: COMMERCIAL

## 2018-08-08 VITALS
BODY MASS INDEX: 27.83 KG/M2 | HEART RATE: 62 BPM | HEIGHT: 73 IN | WEIGHT: 210 LBS | SYSTOLIC BLOOD PRESSURE: 125 MMHG | DIASTOLIC BLOOD PRESSURE: 78 MMHG

## 2018-08-08 DIAGNOSIS — M17.32 POST-TRAUMATIC OSTEOARTHRITIS OF LEFT KNEE: ICD-10-CM

## 2018-08-08 DIAGNOSIS — M22.42 CHONDROMALACIA OF PATELLA, LEFT: ICD-10-CM

## 2018-08-08 DIAGNOSIS — M25.561 CHRONIC PAIN OF BOTH KNEES: Primary | ICD-10-CM

## 2018-08-08 DIAGNOSIS — M25.461 EFFUSION, RIGHT KNEE: ICD-10-CM

## 2018-08-08 DIAGNOSIS — M17.11 PRIMARY OSTEOARTHRITIS OF RIGHT KNEE: ICD-10-CM

## 2018-08-08 DIAGNOSIS — M25.562 CHRONIC PAIN OF BOTH KNEES: Primary | ICD-10-CM

## 2018-08-08 DIAGNOSIS — G89.29 CHRONIC PAIN OF BOTH KNEES: Primary | ICD-10-CM

## 2018-08-08 DIAGNOSIS — M23.92 INTERNAL DERANGEMENT OF KNEE JOINT, LEFT: ICD-10-CM

## 2018-08-08 DIAGNOSIS — M94.262 CHONDROMALACIA OF LEFT KNEE: ICD-10-CM

## 2018-08-08 PROCEDURE — 20611 DRAIN/INJ JOINT/BURSA W/US: CPT | Mod: LT,S$GLB,, | Performed by: ORTHOPAEDIC SURGERY

## 2018-08-08 PROCEDURE — 99999 PR PBB SHADOW E&M-EST. PATIENT-LVL III: CPT | Mod: PBBFAC,,, | Performed by: ORTHOPAEDIC SURGERY

## 2018-08-08 PROCEDURE — 99499 UNLISTED E&M SERVICE: CPT | Mod: S$GLB,,, | Performed by: ORTHOPAEDIC SURGERY

## 2018-08-08 RX ORDER — CELECOXIB 200 MG/1
200 CAPSULE ORAL 2 TIMES DAILY
Qty: 60 CAPSULE | Refills: 2 | Status: SHIPPED | OUTPATIENT
Start: 2018-08-08 | End: 2018-09-07

## 2018-08-08 NOTE — PROGRESS NOTES
Subjective:          Chief Complaint: Virgilio Maher Jr. is a 57 y.o. male who had concerns including Follow-up of the Right Knee and Follow-up of the Left Knee.    Patient is here for a follow up for his both knees. About 10 days ago he noted right knee swelling in his knee. He does not recall an injury or trauma. He is having more pain in his right knee today. However, he does have pain in his left knee when getting in to bed at night.  He has been using Aleve to help with the pain and swelling.    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 Left  Arthroscopy, debridement/shaving of articular cartilage (chondroplasty) 00783  2.  Left  Arthroscopy, with meniscectomy (medial OR lateral) 47672, medial  3.  Left  Arthroscopy, knee, for removal of loose body or foreign body 21515  4.  Left  Amniox Arthrocentesis, 47015            Review of Systems   Constitution: Negative for fever and night sweats.   HENT: Negative for hearing loss.    Eyes: Negative for blurred vision and visual disturbance.   Cardiovascular: Negative for chest pain and leg swelling.   Respiratory: Negative for shortness of breath.    Endocrine: Negative for polyuria.   Hematologic/Lymphatic: Negative for bleeding problem.   Skin: Negative for rash.   Musculoskeletal: Negative for back pain, joint pain, joint swelling, muscle cramps and muscle weakness.   Gastrointestinal: Negative for melena.    Genitourinary: Negative for hematuria.   Neurological: Negative for loss of balance, numbness and paresthesias.   Psychiatric/Behavioral: Negative for altered mental status.       Pain Related Questions  Over the past 3 days, what was your average pain during activity? (I.e. running, jogging, walking, climbing stairs, getting dressed, ect.): 6  Over the past 3 days, what was your highest pain level?: 8  Over the past 3 days, what was your lowest pain level? : 5    Other  Was the patient's HEIGHT measured or patient reported?: Patient Reported  Was the patient's WEIGHT measured or patient reported?: Measured      Objective:        General: Virgilio is well-developed, well-nourished, appears stated age, in no acute distress, alert and oriented to time, place and person.     General    Vitals reviewed.  Constitutional: He is oriented to person, place, and time. He appears well-developed and well-nourished. No distress.   HENT:   Mouth/Throat: No oropharyngeal exudate.   Eyes: Right eye exhibits no discharge. Left eye exhibits no discharge.   Neck: Normal range of motion.   Pulmonary/Chest: Effort normal and breath sounds normal. No respiratory distress.   Neurological: He is alert and oriented to person, place, and time. He has normal reflexes. No cranial nerve deficit. Coordination normal.   Psychiatric: He has a normal mood and affect. His behavior is normal. Judgment and thought content normal.     General Musculoskeletal Exam   Gait: normal       Right Knee Exam     Inspection   Erythema: absent  Scars: absent  Swelling: absent  Effusion: effusion  Deformity: deformity  Bruising: absent    Tenderness   The patient is experiencing no tenderness.         Range of Motion   Extension: 5   Flexion: 130     Tests   Meniscus   Sima:  Medial - negative Lateral - negative  Ligament Examination Lachman: normal (-1 to 2mm) PCL-Posterior Drawer: normal (0 to 2mm)     MCL - Valgus: normal (0 to 2mm)  LCL - Varus:  normalPivot Shift: normal (Equal)Reverse Pivot Shift: normal (Equal)Dial Test at 30 degrees: normal (< 5 degrees)Dial Test at 90 degrees: normal (< 5 degrees)  Posterior Sag Test: negative  Posterolateral Corner: unstable (>15 degrees difference)  Patella   Patellar Apprehension: negative  Passive Patellar Tilt: neutral  Patellar Tracking: normal  Patellar Glide (quadrants): Lateral - 1   Medial - 2  Q-Angle at 90 degrees: normal  Patellar Grind: negative  J-Sign: none    Other   Meniscal Cyst: absent  Popliteal (Baker's) Cyst: absent  Sensation: normal    Left Knee Exam     Inspection   Erythema: absent  Scars: present  Swelling: absent  Effusion: absent  Deformity: deformity  Bruising: absent    Tenderness   The patient tender to palpation of the medial joint line and patellar tendon.    Range of Motion   Extension:  10 abnormal   Flexion:  130 abnormal     Tests   Meniscus   Sima:  Medial - negative Lateral - negative  Stability Lachman: normal (-1 to 2mm) PCL-Posterior Drawer: normal (0 to 2mm)  MCL - Valgus: normal (0 to 2mm)  LCL - Varus: normal (0 to 2mm)Pivot Shift: normal (Equal)Reverse Pivot Shift: normal (Equal)Dial Test at 30 degrees: normal (< 5 degrees)Dial Test at 90 degrees: normal (< 5 degrees)  Posterior Sag Test: negative  Posterolateral Corner: unstable (>15 degrees difference)  Patella   Patellar Apprehension: negative  Passive Patellar Tilt: neutral  Patellar Tracking: normal  Patellar Glide (Quadrants): Lateral - 1 Medial - 2  Q-Angle at 90 degrees: normal  Patellar Grind: negative  J-Sign: J sign absent    Other   Meniscal Cyst: absent  Popliteal (Baker's) Cyst: absent  Sensation: normal    Right Hip Exam     Tests   Samira: negative  Left Hip Exam     Tests   Samira: negative          Reflexes     Left Side  Quadriceps:  2+  Achilles:  2+    Right Side   Quadriceps:  2+  Achilles:  2+    Vascular Exam     Right Pulses  Dorsalis Pedis:      2+  Posterior Tibial:      2+        Left  Pulses  Dorsalis Pedis:      2+  Posterior Tibial:      2+        MRI of Right knee:  Lateral effusion noted and loose body at the lateral patellofemoral joint.       Assessment:       Encounter Diagnoses   Name Primary?    Chronic pain of both knees Yes    Post-traumatic osteoarthritis of left knee     Chondromalacia of patella, left     Chondromalacia of left knee     Internal derangement of knee joint, left     Effusion, right knee     Primary osteoarthritis of right knee           Plan:       1. IKDC, SF-12 and KOOS was not filled out today in clinic.     RTC in 3 months with Dr. Barbara Leija Patient will fill out IKDC, SF-12 and KOOS and bilateral knee series on return.    2. New PT referral for Balwinder Anderson to complete b/l knee exercises for strengthening   May bike, eliptical and swim, avoid deep bends/squating    3. Aspiration/Injection Procedure-Right Knee    After time out was performed, including verification of patient ID, procedure, site and side, availability of information and equipment, review of safety issues, and agreement with consent, the procedure site was marked and the patient was prepped aseptically. 16cc's of serosanguineous joint fluid was aspirated from the right Knee Joint using a 22g x 1.5 needle in sterile fashion without complication. Following aspiration, a diagnostic and therapeutic injection of 6cc SynviscOne and ethyl chloride was given under sterile technique using a 22g x 1.5 needle into the right Knee Joint in seated position.     The patient had no adverse reactions to the medication. Pain decreased. The patient was instructed to apply ice to the joint for 20 minutes and avoid strenuous activities for 24-36 hours following the injection. Patient was warned of possible blood sugar and/or blood pressure changes during that time. Following that time, patient can resume regular activities.    Injection Procedure-Left Knee    After time out was performed, including  verification of patient ID, procedure, site and side, availability of information and equipment, review of safety issues, and agreement with consent, the procedure site was marked and the patient was prepped aseptically. A diagnostic and therapeutic injection of 6cc SynviscOne and ethyl chloride was given under sterile technique using a 22g x 1.5 needle into the left Knee Joint in seated position.     The patient had no adverse reactions to the medication. Pain decreased. The patient was instructed to apply ice to the joint for 20 minutes and avoid strenuous activities for 24-36 hours following the injection. Patient was warned of possible blood sugar and/or blood pressure changes during that time. Following that time, patient can resume regular activities.    Patient was reminded to call the clinic immediately for any adverse side effects as explained in clinic today.    Ultrasound Guidance Procedure  bilateral Knee Joint visualized with documented DJD. Dynamic visualization of the 22g x 1.5 needle performed.    4. Celebrex 200mg BID for 1 month, sent to pharmacy    5. PT Iontophoresis ordered today as well for left knee patella tendon    6. Will place [id number] at next visit.    [id number] Anu, performed a custom orthotic / brace adjustment, fitting and training with the patient. The patient demonstrated understanding and proper care. This was performed for 15 minutes.                    [facility] patient questionnaires have been collected today.

## 2018-11-09 ENCOUNTER — TELEPHONE (OUTPATIENT)
Dept: SPORTS MEDICINE | Facility: CLINIC | Age: 58
End: 2018-11-09

## 2018-11-09 NOTE — TELEPHONE ENCOUNTER
LVM for patient explained that we need to reschedule his appt on 11/14 due to a change in provider schedule. Requested a call back.

## 2019-06-09 ENCOUNTER — OFFICE VISIT (OUTPATIENT)
Dept: URGENT CARE | Facility: CLINIC | Age: 59
End: 2019-06-09
Payer: COMMERCIAL

## 2019-06-09 ENCOUNTER — TELEPHONE (OUTPATIENT)
Dept: URGENT CARE | Facility: CLINIC | Age: 59
End: 2019-06-09

## 2019-06-09 VITALS
RESPIRATION RATE: 18 BRPM | WEIGHT: 210 LBS | SYSTOLIC BLOOD PRESSURE: 133 MMHG | HEART RATE: 70 BPM | OXYGEN SATURATION: 97 % | BODY MASS INDEX: 27.83 KG/M2 | DIASTOLIC BLOOD PRESSURE: 86 MMHG | TEMPERATURE: 97 F | HEIGHT: 73 IN

## 2019-06-09 DIAGNOSIS — J06.9 VIRAL URI WITH COUGH: Primary | ICD-10-CM

## 2019-06-09 PROCEDURE — 3008F BODY MASS INDEX DOCD: CPT | Mod: CPTII,S$GLB,, | Performed by: PHYSICIAN ASSISTANT

## 2019-06-09 PROCEDURE — 99203 OFFICE O/P NEW LOW 30 MIN: CPT | Mod: S$GLB,,, | Performed by: PHYSICIAN ASSISTANT

## 2019-06-09 PROCEDURE — 99203 PR OFFICE/OUTPT VISIT, NEW, LEVL III, 30-44 MIN: ICD-10-PCS | Mod: S$GLB,,, | Performed by: PHYSICIAN ASSISTANT

## 2019-06-09 PROCEDURE — 3008F PR BODY MASS INDEX (BMI) DOCUMENTED: ICD-10-PCS | Mod: CPTII,S$GLB,, | Performed by: PHYSICIAN ASSISTANT

## 2019-06-09 RX ORDER — BENZONATATE 100 MG/1
100 CAPSULE ORAL EVERY 6 HOURS PRN
Qty: 60 CAPSULE | Refills: 1 | Status: SHIPPED | OUTPATIENT
Start: 2019-06-09 | End: 2020-06-08

## 2019-06-09 RX ORDER — PREDNISONE 20 MG/1
20 TABLET ORAL DAILY
Qty: 5 TABLET | Refills: 0 | Status: SHIPPED | OUTPATIENT
Start: 2019-06-09 | End: 2019-06-14

## 2019-06-09 RX ORDER — OLOPATADINE HYDROCHLORIDE 2 MG/ML
1 SOLUTION/ DROPS OPHTHALMIC DAILY
Qty: 2.5 ML | Refills: 0 | Status: SHIPPED | OUTPATIENT
Start: 2019-06-09 | End: 2020-06-08

## 2019-06-09 RX ORDER — LORATADINE 10 MG/1
10 TABLET ORAL DAILY
COMMUNITY

## 2019-06-09 NOTE — PROGRESS NOTES
"Subjective:       Patient ID: Virgilio Maher Jr. is a 58 y.o. male.    Vitals:  height is 6' 1" (1.854 m) and weight is 95.3 kg (210 lb). His oral temperature is 97.4 °F (36.3 °C). His blood pressure is 133/86 and his pulse is 70. His respiration is 18 and oxygen saturation is 97%.     Chief Complaint: Sore Throat and Otalgia    Pt presents today with, ear pressure, slight sore throat X's 3 days, watery eyes pt has taken Claritin    Sore Throat    This is a new problem. The current episode started yesterday. The problem has been unchanged. There has been no fever. The patient is experiencing no pain. Associated symptoms include coughing. Pertinent negatives include no congestion, ear pain, shortness of breath, stridor or vomiting. He has tried NSAIDs (Claritin) for the symptoms. The treatment provided no relief.   Otalgia    Associated symptoms include coughing and a sore throat. Pertinent negatives include no rash or vomiting.       Constitution: Negative for chills, sweating, fatigue and fever.   HENT: Positive for sore throat. Negative for ear pain, congestion, postnasal drip, sinus pain, sinus pressure and voice change.         Ear pressure   Neck: Negative for painful lymph nodes.   Eyes: Positive for eye discharge (watery). Negative for eye redness.   Respiratory: Positive for cough. Negative for chest tightness, sputum production, bloody sputum, COPD, shortness of breath, stridor, wheezing and asthma.    Gastrointestinal: Negative for nausea and vomiting.   Musculoskeletal: Negative for muscle ache.   Skin: Negative for rash.   Allergic/Immunologic: Negative for seasonal allergies and asthma.   Hematologic/Lymphatic: Negative for swollen lymph nodes.       Objective:      Physical Exam   Constitutional: He is oriented to person, place, and time. He appears well-developed and well-nourished. He is cooperative.  Non-toxic appearance. He does not appear ill. No distress.   HENT:   Head: Normocephalic and " atraumatic.   Right Ear: Hearing, tympanic membrane, external ear and ear canal normal.   Left Ear: Hearing, tympanic membrane, external ear and ear canal normal.   Nose: Nose normal. No mucosal edema, rhinorrhea or nasal deformity. No epistaxis. Right sinus exhibits no maxillary sinus tenderness and no frontal sinus tenderness. Left sinus exhibits no maxillary sinus tenderness and no frontal sinus tenderness.   Mouth/Throat: Uvula is midline, oropharynx is clear and moist and mucous membranes are normal. No trismus in the jaw. Normal dentition. No uvula swelling. No posterior oropharyngeal erythema.   Eyes: Conjunctivae and lids are normal. No scleral icterus.   Sclera clear bilat   Neck: Trachea normal, full passive range of motion without pain and phonation normal. Neck supple.   Cardiovascular: Normal rate, regular rhythm, normal heart sounds, intact distal pulses and normal pulses.   Pulmonary/Chest: Effort normal and breath sounds normal. No respiratory distress.   Abdominal: Soft. Normal appearance and bowel sounds are normal. He exhibits no distension. There is no tenderness.   Musculoskeletal: Normal range of motion. He exhibits no edema or deformity.   Neurological: He is alert and oriented to person, place, and time. He exhibits normal muscle tone. Coordination normal.   Skin: Skin is warm, dry and intact. He is not diaphoretic. No pallor.   Psychiatric: He has a normal mood and affect. His speech is normal and behavior is normal. Judgment and thought content normal. Cognition and memory are normal.   Nursing note and vitals reviewed.      Assessment:       1. Viral URI with cough        Plan:         Viral URI with cough    Other orders  -     olopatadine (PATADAY) 0.2 % Drop; Place 1 drop into both eyes once daily.  Dispense: 2.5 mL; Refill: 0  -     predniSONE (DELTASONE) 20 MG tablet; Take 1 tablet (20 mg total) by mouth once daily. for 5 days  Dispense: 5 tablet; Refill: 0  -     benzonatate  (TESDAX HERNDON) 100 MG capsule; Take 1 capsule (100 mg total) by mouth every 6 (six) hours as needed.  Dispense: 60 capsule; Refill: 1    Discussed risks versus benefits of low-dose steroids.  Patient would like to proceed.    Symptomatic treatment:    Alternate Tylenol and Ibuprofen every 3 hrs  salt water gargles to soothe throat  Honey/lemon water to soothe throat  Cold-eeze helps to reduce the duration of URI symptoms  Elderberry to reduce duration of URI symptoms  Cepachol helps to numb the discomfort in throat  Nasal saline spray reduces inflammation and dryness  Warm face compresses/hot showers as often as you can to open sinuses   Vicks vapor rub at night  Flonase OTC or Nasacort OTC  Simple foods like chicken noodle soup help hydrate  Delsym helps with coughing at night  Zyrtec/Claritin during the day and Benadryl at night may help if allergy component   Zantac will help if there is reflux from the post nasal drip  Rest as much as you can  Your symptoms will likely last 7-10 days, maybe longer depending on how it affects your body.  You are contagious 7-10, so minimize contact with others to reduce the spread to others and stay home from work or school as we discussed. Dehydration is preventable but is one of the main reasons why you will feel so badly. Drink pedialyte, gatorade or propel. Stay hydrated.  Antibiotics are not needed unless a complication(such as Otitis Media, Bacterial sinus infection or pneumonia). Taking antibiotics for Flu/Cold is not supported by evidencebased medicine and can expose you to unnecessary side effects of the medication, such as anaphylaxis.   If you experience any:  Chest pain, shortness of breath, wheezing or difficulty breathing  Severe headache, face, neck or ear pain  New rash  Fever over 101.5º F (38.6 C) for more than three days  Confusion, behavior change or seizure  Severe weakness or dizziness  Go to ER

## 2019-06-12 ENCOUNTER — TELEPHONE (OUTPATIENT)
Dept: URGENT CARE | Facility: CLINIC | Age: 59
End: 2019-06-12

## 2019-09-23 ENCOUNTER — TELEPHONE (OUTPATIENT)
Dept: SPORTS MEDICINE | Facility: CLINIC | Age: 59
End: 2019-09-23

## 2019-09-23 DIAGNOSIS — M25.461 EFFUSION, RIGHT KNEE: ICD-10-CM

## 2019-09-23 DIAGNOSIS — M25.562 LEFT ANTERIOR KNEE PAIN: ICD-10-CM

## 2019-09-23 DIAGNOSIS — M22.42 CHONDROMALACIA OF PATELLA, LEFT: ICD-10-CM

## 2019-09-23 DIAGNOSIS — M17.32 POST-TRAUMATIC OSTEOARTHRITIS OF LEFT KNEE: ICD-10-CM

## 2019-09-23 DIAGNOSIS — M94.262 CHONDROMALACIA OF LEFT KNEE: ICD-10-CM

## 2019-09-23 DIAGNOSIS — M17.11 PRIMARY OSTEOARTHRITIS OF RIGHT KNEE: ICD-10-CM

## 2019-09-23 DIAGNOSIS — M23.92 INTERNAL DERANGEMENT OF KNEE JOINT, LEFT: Primary | ICD-10-CM

## 2020-09-03 ENCOUNTER — OFFICE VISIT (OUTPATIENT)
Dept: URGENT CARE | Facility: CLINIC | Age: 60
End: 2020-09-03
Payer: COMMERCIAL

## 2020-09-03 VITALS
WEIGHT: 210 LBS | HEIGHT: 73 IN | HEART RATE: 71 BPM | DIASTOLIC BLOOD PRESSURE: 73 MMHG | BODY MASS INDEX: 27.83 KG/M2 | TEMPERATURE: 99 F | SYSTOLIC BLOOD PRESSURE: 119 MMHG | RESPIRATION RATE: 16 BRPM | OXYGEN SATURATION: 96 %

## 2020-09-03 DIAGNOSIS — U07.1 COVID-19 VIRUS DETECTED: ICD-10-CM

## 2020-09-03 DIAGNOSIS — R53.83 FATIGUE, UNSPECIFIED TYPE: Primary | ICD-10-CM

## 2020-09-03 LAB
CTP QC/QA: YES
SARS-COV-2 RDRP RESP QL NAA+PROBE: POSITIVE

## 2020-09-03 PROCEDURE — 99214 OFFICE O/P EST MOD 30 MIN: CPT | Mod: S$GLB,CS,, | Performed by: FAMILY MEDICINE

## 2020-09-03 PROCEDURE — U0002: ICD-10-PCS | Mod: S$GLB,,, | Performed by: FAMILY MEDICINE

## 2020-09-03 PROCEDURE — 99214 PR OFFICE/OUTPT VISIT, EST, LEVL IV, 30-39 MIN: ICD-10-PCS | Mod: S$GLB,CS,, | Performed by: FAMILY MEDICINE

## 2020-09-03 PROCEDURE — U0002 COVID-19 LAB TEST NON-CDC: HCPCS | Mod: S$GLB,,, | Performed by: FAMILY MEDICINE

## 2020-09-03 NOTE — PROGRESS NOTES
"Subjective:       Patient ID: Virgilio Maher Jr. is a 59 y.o. male.    Vitals:  height is 6' 1" (1.854 m) and weight is 95.3 kg (210 lb). His temperature is 99.2 °F (37.3 °C). His blood pressure is 119/73 and his pulse is 71. His respiration is 16 and oxygen saturation is 96%.     Chief Complaint: Fatigue    Pt just started working out again with a . Pt has been sore. Pt started with a cough from a tickle. On Wednesday night pt was in his pool and when he got out he started feeling light ear fullness. Pt says he has been feeling a general sense of weakness from the chest up for about 2 days now. Pt denies COVID19 symptoms. Pt is going out of town this weekend and wants to make sure this isn't more than soreness from working out. Pt took Tylenol Cold & Flu and the cough subsided.     Pt isn't sure if he should be worried about COVID19 or not.     Fatigue  This is a new problem. The current episode started in the past 7 days. Associated symptoms include coughing and fatigue. Treatments tried: Tylenol Cold & Flu.       Constitution: Positive for fatigue.   Neck: Negative for painful lymph nodes.   Cardiovascular: Negative for leg swelling.   Eyes: Negative for double vision and blurred vision.   Respiratory: Positive for cough. Negative for shortness of breath.    Gastrointestinal: Negative for diarrhea.   Genitourinary: Negative for dysuria, frequency and urgency.   Musculoskeletal: Negative for muscle cramps.   Skin: Negative for color change and pale.   Allergic/Immunologic: Negative for seasonal allergies.   Neurological: Negative for dizziness, light-headedness and passing out.   Hematologic/Lymphatic: Negative for swollen lymph nodes, easy bruising/bleeding and history of blood clots. Does not bruise/bleed easily.   Psychiatric/Behavioral: Negative for nervous/anxious, sleep disturbance and depression. The patient is not nervous/anxious.        Objective:      Physical Exam   Constitutional: He is " oriented to person, place, and time. He appears well-developed.   HENT:   Head: Normocephalic and atraumatic.   Ears:   Right Ear: External ear normal.   Left Ear: External ear normal.   Nose: Nose normal.   Mouth/Throat: Oropharynx is clear and moist.   Eyes: Pupils are equal, round, and reactive to light. Conjunctivae, EOM and lids are normal.   Neck: Trachea normal, full passive range of motion without pain and phonation normal. Neck supple.   Cardiovascular: Normal rate.   Pulmonary/Chest: Effort normal.   Musculoskeletal: Normal range of motion.   Neurological: He is alert and oriented to person, place, and time.   Skin: Skin is warm, dry and intact. Psychiatric: His speech is normal and behavior is normal. Judgment and thought content normal.   Nursing note and vitals reviewed.        Assessment:       1. Fatigue, unspecified type    2. COVID-19 virus detected        Plan:         Fatigue, unspecified type  -     POCT COVID-19 Rapid Screening    COVID-19 virus detected

## 2021-02-25 PROBLEM — K76.89 LIVER CYST: Status: ACTIVE | Noted: 2021-02-25

## 2023-01-31 ENCOUNTER — OFFICE VISIT (OUTPATIENT)
Dept: URGENT CARE | Facility: CLINIC | Age: 63
End: 2023-01-31
Payer: COMMERCIAL

## 2023-01-31 VITALS
DIASTOLIC BLOOD PRESSURE: 84 MMHG | SYSTOLIC BLOOD PRESSURE: 133 MMHG | BODY MASS INDEX: 27.83 KG/M2 | RESPIRATION RATE: 17 BRPM | OXYGEN SATURATION: 98 % | HEIGHT: 73 IN | WEIGHT: 210 LBS | HEART RATE: 83 BPM | TEMPERATURE: 99 F

## 2023-01-31 DIAGNOSIS — R09.81 NASAL CONGESTION: Primary | ICD-10-CM

## 2023-01-31 PROCEDURE — 1159F MED LIST DOCD IN RCRD: CPT | Mod: CPTII,S$GLB,, | Performed by: PHYSICIAN ASSISTANT

## 2023-01-31 PROCEDURE — 3075F PR MOST RECENT SYSTOLIC BLOOD PRESS GE 130-139MM HG: ICD-10-PCS | Mod: CPTII,S$GLB,, | Performed by: PHYSICIAN ASSISTANT

## 2023-01-31 PROCEDURE — 99213 OFFICE O/P EST LOW 20 MIN: CPT | Mod: S$GLB,,, | Performed by: PHYSICIAN ASSISTANT

## 2023-01-31 PROCEDURE — 3079F PR MOST RECENT DIASTOLIC BLOOD PRESSURE 80-89 MM HG: ICD-10-PCS | Mod: CPTII,S$GLB,, | Performed by: PHYSICIAN ASSISTANT

## 2023-01-31 PROCEDURE — 99213 PR OFFICE/OUTPT VISIT, EST, LEVL III, 20-29 MIN: ICD-10-PCS | Mod: S$GLB,,, | Performed by: PHYSICIAN ASSISTANT

## 2023-01-31 PROCEDURE — 1160F PR REVIEW ALL MEDS BY PRESCRIBER/CLIN PHARMACIST DOCUMENTED: ICD-10-PCS | Mod: CPTII,S$GLB,, | Performed by: PHYSICIAN ASSISTANT

## 2023-01-31 PROCEDURE — 3079F DIAST BP 80-89 MM HG: CPT | Mod: CPTII,S$GLB,, | Performed by: PHYSICIAN ASSISTANT

## 2023-01-31 PROCEDURE — 3075F SYST BP GE 130 - 139MM HG: CPT | Mod: CPTII,S$GLB,, | Performed by: PHYSICIAN ASSISTANT

## 2023-01-31 PROCEDURE — 3008F PR BODY MASS INDEX (BMI) DOCUMENTED: ICD-10-PCS | Mod: CPTII,S$GLB,, | Performed by: PHYSICIAN ASSISTANT

## 2023-01-31 PROCEDURE — 1160F RVW MEDS BY RX/DR IN RCRD: CPT | Mod: CPTII,S$GLB,, | Performed by: PHYSICIAN ASSISTANT

## 2023-01-31 PROCEDURE — 1159F PR MEDICATION LIST DOCUMENTED IN MEDICAL RECORD: ICD-10-PCS | Mod: CPTII,S$GLB,, | Performed by: PHYSICIAN ASSISTANT

## 2023-01-31 PROCEDURE — 3008F BODY MASS INDEX DOCD: CPT | Mod: CPTII,S$GLB,, | Performed by: PHYSICIAN ASSISTANT

## 2023-01-31 RX ORDER — AZELASTINE 1 MG/ML
1 SPRAY, METERED NASAL 2 TIMES DAILY
Qty: 30 ML | Refills: 0 | Status: SHIPPED | OUTPATIENT
Start: 2023-01-31 | End: 2024-01-31

## 2023-01-31 RX ORDER — METHYLPREDNISOLONE 4 MG/1
TABLET ORAL
Qty: 1 EACH | Refills: 0 | Status: SHIPPED | OUTPATIENT
Start: 2023-01-31

## 2023-01-31 RX ORDER — FLUTICASONE PROPIONATE 50 MCG
1 SPRAY, SUSPENSION (ML) NASAL 2 TIMES DAILY
Qty: 16 G | Refills: 0 | Status: SHIPPED | OUTPATIENT
Start: 2023-01-31

## 2023-01-31 NOTE — PATIENT INSTRUCTIONS

## 2023-01-31 NOTE — PROGRESS NOTES
"Subjective:       Patient ID: Virgilio Maher Jr. is a 62 y.o. male.    Vitals:  height is 6' 1" (1.854 m) and weight is 95.3 kg (210 lb). His temperature is 99.3 °F (37.4 °C). His blood pressure is 133/84 and his pulse is 83. His respiration is 17 and oxygen saturation is 98%.     Chief Complaint: Sinus Problem    Pt came in today with complaints of congestion, dry cough, watery eyes and sinus pressure that started Saturday (3 days ago). He stated that his wife and son came here a few days ago for the same symptoms. Pt stated that he took a home Covid test right before coming here and it was negative.  He does not want to be tested here.  He has been taking OTC medications for the symptoms    Sinus Problem  This is a new problem. The current episode started in the past 7 days. The problem has been gradually worsening since onset. There has been no fever. His pain is at a severity of 10/10. The pain is severe. Associated symptoms include chills, congestion, coughing, sinus pressure and sneezing. Pertinent negatives include no diaphoresis, ear pain, headaches, hoarse voice, neck pain, shortness of breath, sore throat or swollen glands. Past treatments include acetaminophen. The treatment provided no relief.   Constitution: Positive for chills. Negative for sweating.   HENT:  Positive for congestion and sinus pressure. Negative for ear pain and sore throat.    Neck: Negative for neck pain.   Respiratory:  Positive for cough. Negative for shortness of breath.    Allergic/Immunologic: Positive for sneezing.   Neurological:  Negative for headaches.     Objective:      Physical Exam   Constitutional: He does not appear ill. No distress.   HENT:   Head: Normocephalic and atraumatic.   Ears:   Right Ear: Tympanic membrane, external ear and ear canal normal.   Left Ear: Tympanic membrane, external ear and ear canal normal.   Mouth/Throat: Mucous membranes are moist. No oropharyngeal exudate or posterior oropharyngeal " erythema. Oropharynx is clear.   Eyes: Conjunctivae are normal. Right eye exhibits no discharge. Left eye exhibits no discharge. Extraocular movement intact   Cardiovascular: Normal rate, regular rhythm and normal heart sounds.   No murmur heard.  Pulmonary/Chest: Effort normal and breath sounds normal. He has no wheezes. He has no rhonchi. He has no rales.   Abdominal: Normal appearance.   Musculoskeletal: Normal range of motion.         General: Normal range of motion.   Neurological: He is alert.   Skin: Skin is warm, dry and not pale. jaundice  Psychiatric: His behavior is normal. Mood, judgment and thought content normal.   Nursing note and vitals reviewed.      Assessment:       1. Nasal congestion          Plan:         Nasal congestion    Other orders  -     azelastine (ASTELIN) 137 mcg (0.1 %) nasal spray; 1 spray (137 mcg total) by Nasal route 2 (two) times daily.  Dispense: 30 mL; Refill: 0  -     fluticasone propionate (FLONASE ALLERGY RELIEF) 50 mcg/actuation nasal spray; 1 spray (50 mcg total) by Each Nostril route 2 (two) times daily.  Dispense: 16 g; Refill: 0  -     methylPREDNISolone (MEDROL DOSEPACK) 4 mg tablet; Take as directed on pack  Dispense: 1 each; Refill: 0    Wife was given Medrol Dosepak, Flonase and Astelin.  Will send over the same.  Symptoms consistent with viral URI.     Patient Instructions   You must understand that you've received an Urgent Care treatment only and that you may be released before all your medical problems are known or treated. You, the patient, will arrange for follow up care as instructed.  Follow up with your PCP or specialty clinic as directed in the next 1-2 weeks if not improved or as needed.  You can call [phone] to schedule an appointment with the appropriate provider.  If your condition worsens we recommend that you receive another evaluation at the emergency room immediately or contact your primary medical clinics after hours call service to  discuss your concerns.  Please return here or go to the Emergency Department for any concerns or worsening of condition.

## 2023-02-08 ENCOUNTER — OFFICE VISIT (OUTPATIENT)
Dept: URGENT CARE | Facility: CLINIC | Age: 63
End: 2023-02-08
Payer: COMMERCIAL

## 2023-02-08 VITALS
DIASTOLIC BLOOD PRESSURE: 80 MMHG | WEIGHT: 210 LBS | HEART RATE: 65 BPM | OXYGEN SATURATION: 98 % | HEIGHT: 73 IN | BODY MASS INDEX: 27.83 KG/M2 | SYSTOLIC BLOOD PRESSURE: 125 MMHG | TEMPERATURE: 98 F | RESPIRATION RATE: 16 BRPM

## 2023-02-08 DIAGNOSIS — B96.89 BACTERIAL SINUSITIS: Primary | ICD-10-CM

## 2023-02-08 DIAGNOSIS — J32.9 BACTERIAL SINUSITIS: Primary | ICD-10-CM

## 2023-02-08 PROCEDURE — 1159F MED LIST DOCD IN RCRD: CPT | Mod: CPTII,S$GLB,, | Performed by: NURSE PRACTITIONER

## 2023-02-08 PROCEDURE — 3008F PR BODY MASS INDEX (BMI) DOCUMENTED: ICD-10-PCS | Mod: CPTII,S$GLB,, | Performed by: NURSE PRACTITIONER

## 2023-02-08 PROCEDURE — 3008F BODY MASS INDEX DOCD: CPT | Mod: CPTII,S$GLB,, | Performed by: NURSE PRACTITIONER

## 2023-02-08 PROCEDURE — 3079F PR MOST RECENT DIASTOLIC BLOOD PRESSURE 80-89 MM HG: ICD-10-PCS | Mod: CPTII,S$GLB,, | Performed by: NURSE PRACTITIONER

## 2023-02-08 PROCEDURE — 3074F SYST BP LT 130 MM HG: CPT | Mod: CPTII,S$GLB,, | Performed by: NURSE PRACTITIONER

## 2023-02-08 PROCEDURE — 1160F RVW MEDS BY RX/DR IN RCRD: CPT | Mod: CPTII,S$GLB,, | Performed by: NURSE PRACTITIONER

## 2023-02-08 PROCEDURE — 3074F PR MOST RECENT SYSTOLIC BLOOD PRESSURE < 130 MM HG: ICD-10-PCS | Mod: CPTII,S$GLB,, | Performed by: NURSE PRACTITIONER

## 2023-02-08 PROCEDURE — 99213 OFFICE O/P EST LOW 20 MIN: CPT | Mod: S$GLB,,, | Performed by: NURSE PRACTITIONER

## 2023-02-08 PROCEDURE — 1160F PR REVIEW ALL MEDS BY PRESCRIBER/CLIN PHARMACIST DOCUMENTED: ICD-10-PCS | Mod: CPTII,S$GLB,, | Performed by: NURSE PRACTITIONER

## 2023-02-08 PROCEDURE — 3079F DIAST BP 80-89 MM HG: CPT | Mod: CPTII,S$GLB,, | Performed by: NURSE PRACTITIONER

## 2023-02-08 PROCEDURE — 1159F PR MEDICATION LIST DOCUMENTED IN MEDICAL RECORD: ICD-10-PCS | Mod: CPTII,S$GLB,, | Performed by: NURSE PRACTITIONER

## 2023-02-08 PROCEDURE — 99213 PR OFFICE/OUTPT VISIT, EST, LEVL III, 20-29 MIN: ICD-10-PCS | Mod: S$GLB,,, | Performed by: NURSE PRACTITIONER

## 2023-02-08 RX ORDER — AMOXICILLIN AND CLAVULANATE POTASSIUM 875; 125 MG/1; MG/1
1 TABLET, FILM COATED ORAL EVERY 12 HOURS
Qty: 14 TABLET | Refills: 0 | Status: SHIPPED | OUTPATIENT
Start: 2023-02-08 | End: 2023-02-15

## 2023-02-08 NOTE — PROGRESS NOTES
"Subjective:       Patient ID: Virgilio Maher Jr. is a 62 y.o. male.    Vitals:  height is 6' 1" (1.854 m) and weight is 95.3 kg (210 lb). His oral temperature is 97.7 °F (36.5 °C). His blood pressure is 125/80 and his pulse is 65. His respiration is 16 and oxygen saturation is 98%.     Chief Complaint: Cough    Pt presents for a follow up for cough and congestion x 11 days. Pt was seen on 1/31/23 for these symptoms. Otc meds taken with mild relief and pain scale is 0/10.    Provider note begins below:   Patient denies fever or chills. Denies cp, sob, nausea or abdominal pain. Awake and alert. Afebrile. Reports nasal discharge greater that 11 days.    Cough  This is a new problem. The current episode started 1 to 4 weeks ago. The problem has been unchanged. The problem occurs hourly. The cough is Productive of sputum. Pertinent negatives include no chest pain, chills, ear pain, eye redness, fever, postnasal drip, rash, shortness of breath or wheezing. Nothing aggravates the symptoms. He has tried OTC cough suppressant and oral steroids (sudifed, clariton,) for the symptoms. The treatment provided mild relief. There is no history of asthma, bronchitis or pneumonia.     Constitution: Negative. Negative for chills, fatigue and fever.   HENT:  Positive for congestion. Negative for ear pain, ear discharge, facial swelling, postnasal drip and sinus pressure.    Neck: Negative for neck pain, neck stiffness and painful lymph nodes.   Cardiovascular: Negative.  Negative for chest pain and sob on exertion.   Eyes: Negative.  Negative for eye itching, eye pain and eye redness.   Respiratory:  Positive for cough. Negative for chest tightness, shortness of breath, wheezing and asthma.    Gastrointestinal: Negative.  Negative for abdominal pain, nausea and vomiting.   Endocrine: negative. excessive thirst.   Genitourinary: Negative.  Negative for dysuria, frequency, urgency and flank pain.   Musculoskeletal: Negative.  Negative " for pain, trauma, joint pain and joint swelling.   Skin: Negative.  Negative for rash, wound, lesion and hives.   Allergic/Immunologic: Negative.  Negative for eczema, asthma, hives, itching and sneezing.   Neurological: Negative.  Negative for dizziness, passing out, disorientation and altered mental status.   Hematologic/Lymphatic: Negative.  Negative for swollen lymph nodes.   Psychiatric/Behavioral: Negative.  Negative for altered mental status, disorientation and confusion.      Objective:      Physical Exam   Constitutional: He is oriented to person, place, and time. He appears well-developed. He is cooperative.  Non-toxic appearance. He does not appear ill. No distress.   HENT:   Head: Normocephalic and atraumatic.   Ears:   Right Ear: Hearing, tympanic membrane, external ear and ear canal normal. impacted cerumen  Left Ear: Hearing, tympanic membrane, external ear and ear canal normal. impacted cerumen  Nose: Mucosal edema and purulent discharge present. No rhinorrhea, nasal deformity or congestion. No epistaxis. Right sinus exhibits no maxillary sinus tenderness and no frontal sinus tenderness. Left sinus exhibits no maxillary sinus tenderness and no frontal sinus tenderness.   Mouth/Throat: Uvula is midline, oropharynx is clear and moist and mucous membranes are normal. No trismus in the jaw. Normal dentition. No uvula swelling. No oropharyngeal exudate, posterior oropharyngeal edema or posterior oropharyngeal erythema.   Eyes: Conjunctivae and lids are normal. No scleral icterus.   Neck: Trachea normal and phonation normal. Neck supple. No edema present. No erythema present. No neck rigidity present.   Cardiovascular: Normal rate, regular rhythm, normal heart sounds and normal pulses.   Pulmonary/Chest: Effort normal and breath sounds normal. No stridor. No respiratory distress. He has no decreased breath sounds. He has no wheezes. He has no rhonchi. He has no rales. He exhibits no tenderness.    Abdominal: Normal appearance. Soft. flat abdomen There is no abdominal tenderness. There is no guarding, no left CVA tenderness and no right CVA tenderness.   Musculoskeletal: Normal range of motion.         General: No deformity. Normal range of motion.   Lymphadenopathy:     He has no cervical adenopathy.   Neurological: no focal deficit. He is alert and oriented to person, place, and time. He exhibits normal muscle tone. Coordination normal.   Skin: Skin is warm, dry, intact, not diaphoretic and not pale.   Psychiatric: His speech is normal and behavior is normal. Mood, judgment and thought content normal.   Nursing note and vitals reviewed.      Assessment:       1. Bacterial sinusitis          Plan:       FOLLOWUP  Follow up if symptoms worsen or fail to improve, for PLEASE CONTACT PCP OR CONTACT THE EMERGENCY ROOM..     PATIENT INSTRUCTIONS  Patient Instructions   INSTRUCTIONS:  - Rest.  - Drink plenty of fluids.  - Take Tylenol and/or Ibuprofen as directed as needed for fever/pain.  Do not take more than the recommended dose.  - follow up with your PCP within the next 1-2 weeks as needed.  - You must understand that you have received an Urgent Care treatment only and that you may be released before all of your medical problems are known or treated.   - You, the patient, will arrange for follow up care as instructed.   - If your condition worsens or fails to improve we recommend that you receive another evaluation at the ER immediately or contact your PCP to discuss your concerns.   - You can call [phone] or [phone] to help schedule an appointment with the appropriate provider.     -If you smoke cigarettes, it would be beneficial for you to stop.         THANK YOU FOR ALLOWING ME TO PARTICIPATE IN YOUR HEALTHCARE,     Guilherme Hernandez NP   Bacterial sinusitis  -     amoxicillin-clavulanate 875-125mg (AUGMENTIN) 875-125 mg per tablet; Take 1 tablet by mouth every 12 (twelve) hours. for 7 days   Dispense: 14 tablet; Refill: 0

## 2023-02-08 NOTE — PATIENT INSTRUCTIONS

## 2025-07-10 NOTE — TRANSFER OF CARE
"Anesthesia Transfer of Care Note    Patient: Virgilio Maher JrSumanth    Procedure(s) Performed: Procedure(s) (LRB):  ARTHROSCOPY-KNEE (Left)  CHONDROPLASTY-KNEE (Left)  SYNOVECTOMY-KNEE (Left)  INJECTION-STEROID; LEFT KNEE AMNIOX  (Left)  ARTHROSCOPY-MENISCECTOMY MEDIAL AND LATERAL (Left)  REMOVAL-LOOSE-BODY-ARTHROSCOPIC (Left)    Patient location: PACU    Anesthesia Type: general    Transport from OR: Transported from OR on room air with adequate spontaneous ventilation    Post pain: adequate analgesia    Post assessment: no apparent anesthetic complications    Post vital signs: stable    Level of consciousness: responds to stimulation    Nausea/Vomiting: no nausea/vomiting    Complications: none    Transfer of care protocol was followed      Last vitals:   Visit Vitals  BP (!) 140/85 (BP Location: Right arm, Patient Position: Lying)   Pulse (!) 59   Temp 36.6 °C (97.8 °F) (Oral)   Resp 18   Ht 6' 1" (1.854 m)   Wt 94.3 kg (208 lb)   SpO2 99%   BMI 27.44 kg/m²     " Lab Results   Component Value Date    HGBA1C 8.0 (H) 06/24/2025     Recent Labs     07/09/25  2050 07/10/25  0606 07/10/25  1039 07/10/25  1605   POCGLU 230* 111 189* 221*     Home: Metformin 1000mg daily. In hospital he was on lantus 6U daily while the home metformin was on hold  Patient extremely frustrated with high blood sugars.  Initially resumed Metformin 1000 mg daily but now increased to BID  Continue accuchecks and SSI for now.    Likely need to discontinue Lantus as I would not recommend discharging patient on insulin however blood sugars still running high so won't d/c the lantus yet  Consider addition of SGLT such as Jardiance for tighter glycemic control in the patient with a history of CAD.  Continue to monitor LFTs closely and if there is any concern their elevation is due to Metformin, I would recommend endocrine consult for treatment recommendations.

## (undated) DEVICE — TUBE SET INFLOW/OUTFLOW

## (undated) DEVICE — TOURNIQUET SB QC SP 34X4IN

## (undated) DEVICE — PAD COLD THERAPY KNEE WRAP ON

## (undated) DEVICE — SEE MEDLINE ITEM 157169

## (undated) DEVICE — BLADE 4.2MM PREBENT ULTRACUT

## (undated) DEVICE — UNDERGLOVES BIOGEL PI SZ 7 LF

## (undated) DEVICE — SUT ETHILON 4-0 BLK MONO

## (undated) DEVICE — GLOVE BIOGEL SKINSENSE PI 8.5

## (undated) DEVICE — ELECTRODE 90 DEGREE ANGLE

## (undated) DEVICE — NEEDLE HYPODERMIC ALUM HUB 22G

## (undated) DEVICE — SUT VICRYL 3-0 27 FS-2

## (undated) DEVICE — SYR 30CC LUER LOCK

## (undated) DEVICE — SOL IRR NACL .9% 3000ML

## (undated) DEVICE — CONTAINER SPECIMEN STRL 4OZ

## (undated) DEVICE — APPLICATOR CHLORAPREP ORN 26ML

## (undated) DEVICE — Device

## (undated) DEVICE — UNDERGLOVES BIOGEL PI SIZE 7.5

## (undated) DEVICE — SHAVER SYS 5.5 ULRAFRR

## (undated) DEVICE — GOWN B1 X-LG X-LONG

## (undated) DEVICE — DRAPE EMERALD 87X114.75X113

## (undated) DEVICE — DRESSING XEROFORM FOIL PK 1X8

## (undated) DEVICE — DRESSING XEROFORM 1X8IN

## (undated) DEVICE — SEE MEDLINE ITEM 157150

## (undated) DEVICE — GLOVE BIOGEL SKINSENSE PI 7.0

## (undated) DEVICE — DRAPE STERI INSTRUMENT 1018

## (undated) DEVICE — CHLORAPREP W TINT 26ML APPL

## (undated) DEVICE — DRESSING GAUZE 6PLY 4X4

## (undated) DEVICE — PADDING CAST SPECIALIST 6X4YD

## (undated) DEVICE — PAD ELECTRODE STER 1.5X3

## (undated) DEVICE — SOL 9P NACL IRR PIC IL

## (undated) DEVICE — GAUZE SPONGE 4X4 12PLY

## (undated) DEVICE — NDL SAFETY 22G X 1.5 ECLIPSE

## (undated) DEVICE — PAD CAST SPECIALIST STRL 6

## (undated) DEVICE — PAD KNEE POLAR XL

## (undated) DEVICE — PAD ABD 8X10 STERILE

## (undated) DEVICE — UNDERGLOVES BIOGEL PI SIZE 8.5

## (undated) DEVICE — SHAVER ULTRAFFR 4.2MM